# Patient Record
Sex: FEMALE | Race: BLACK OR AFRICAN AMERICAN | NOT HISPANIC OR LATINO | Employment: FULL TIME | URBAN - METROPOLITAN AREA
[De-identification: names, ages, dates, MRNs, and addresses within clinical notes are randomized per-mention and may not be internally consistent; named-entity substitution may affect disease eponyms.]

---

## 2018-10-05 LAB
C TRACH DNA SPEC QL PROBE+SIG AMP: NEGATIVE
N GONORRHOEA DNA SPEC QL PROBE+SIG AMP: NEGATIVE
SPECIMEN DESCRIP: NORMAL
SPECIMEN DESCRIPTION: NORMAL

## 2018-11-13 ENCOUNTER — TELEPHONE (OUTPATIENT)
Dept: MIDWIFE SERVICES | Facility: CLINIC | Age: 23
End: 2018-11-13

## 2018-11-13 NOTE — TELEPHONE ENCOUNTER
Patient LMP was 10/5/17, 5w4d. Pt calling to set up first prenatal appointment. Nurse intake appointment scheduled. Pt asked what she needs to know for pregnancy since this is her first pregnancy. Discussed with patient that all of this information will be given to her at her nurse intake appointment. Advised taking a prenatal vitamin - pt will purchase OTC. Asked pt if she is planning on seeing midwife or provider for pregnancy. Pt unsure - asked what the difference was. Explained, told her to think about it and have an idea when she comes for nurse intake appt as Irasema will set up her first prenatal appt. Pt stated understanding. Amanda Jackman, RN-BSN

## 2018-11-28 ENCOUNTER — PRENATAL OFFICE VISIT (OUTPATIENT)
Dept: NURSING | Facility: CLINIC | Age: 23
End: 2018-11-28
Payer: MEDICAID

## 2018-11-28 VITALS
SYSTOLIC BLOOD PRESSURE: 101 MMHG | TEMPERATURE: 98 F | HEART RATE: 76 BPM | BODY MASS INDEX: 18.15 KG/M2 | HEIGHT: 67 IN | DIASTOLIC BLOOD PRESSURE: 68 MMHG | WEIGHT: 115.6 LBS

## 2018-11-28 DIAGNOSIS — Z23 NEED FOR TDAP VACCINATION: ICD-10-CM

## 2018-11-28 DIAGNOSIS — Z34.00 SUPERVISION OF NORMAL FIRST PREGNANCY: Primary | ICD-10-CM

## 2018-11-28 LAB
ABO + RH BLD: NORMAL
ABO + RH BLD: NORMAL
ALBUMIN UR-MCNC: NEGATIVE MG/DL
APPEARANCE UR: CLEAR
BILIRUB UR QL STRIP: NEGATIVE
BLD GP AB SCN SERPL QL: NORMAL
BLOOD BANK CMNT PATIENT-IMP: NORMAL
COLOR UR AUTO: YELLOW
ERYTHROCYTE [DISTWIDTH] IN BLOOD BY AUTOMATED COUNT: 12 % (ref 10–15)
GLUCOSE UR STRIP-MCNC: NEGATIVE MG/DL
HCT VFR BLD AUTO: 36.4 % (ref 35–47)
HGB BLD-MCNC: 12.5 G/DL (ref 11.7–15.7)
HGB UR QL STRIP: NEGATIVE
KETONES UR STRIP-MCNC: NEGATIVE MG/DL
LEUKOCYTE ESTERASE UR QL STRIP: ABNORMAL
MCH RBC QN AUTO: 30.5 PG (ref 26.5–33)
MCHC RBC AUTO-ENTMCNC: 34.3 G/DL (ref 31.5–36.5)
MCV RBC AUTO: 89 FL (ref 78–100)
NITRATE UR QL: NEGATIVE
PH UR STRIP: 6.5 PH (ref 5–7)
PLATELET # BLD AUTO: 254 10E9/L (ref 150–450)
RBC # BLD AUTO: 4.1 10E12/L (ref 3.8–5.2)
SOURCE: ABNORMAL
SP GR UR STRIP: 1.02 (ref 1–1.03)
SPECIMEN EXP DATE BLD: NORMAL
UROBILINOGEN UR STRIP-ACNC: 0.2 EU/DL (ref 0.2–1)
WBC # BLD AUTO: 7.7 10E9/L (ref 4–11)

## 2018-11-28 PROCEDURE — 86900 BLOOD TYPING SEROLOGIC ABO: CPT | Performed by: ADVANCED PRACTICE MIDWIFE

## 2018-11-28 PROCEDURE — 85027 COMPLETE CBC AUTOMATED: CPT | Performed by: ADVANCED PRACTICE MIDWIFE

## 2018-11-28 PROCEDURE — G0499 HEPB SCREEN HIGH RISK INDIV: HCPCS | Performed by: ADVANCED PRACTICE MIDWIFE

## 2018-11-28 PROCEDURE — 86762 RUBELLA ANTIBODY: CPT | Performed by: ADVANCED PRACTICE MIDWIFE

## 2018-11-28 PROCEDURE — 87389 HIV-1 AG W/HIV-1&-2 AB AG IA: CPT | Performed by: ADVANCED PRACTICE MIDWIFE

## 2018-11-28 PROCEDURE — 86850 RBC ANTIBODY SCREEN: CPT | Performed by: ADVANCED PRACTICE MIDWIFE

## 2018-11-28 PROCEDURE — 87086 URINE CULTURE/COLONY COUNT: CPT | Performed by: ADVANCED PRACTICE MIDWIFE

## 2018-11-28 PROCEDURE — 99000 SPECIMEN HANDLING OFFICE-LAB: CPT | Performed by: ADVANCED PRACTICE MIDWIFE

## 2018-11-28 PROCEDURE — 86780 TREPONEMA PALLIDUM: CPT | Performed by: ADVANCED PRACTICE MIDWIFE

## 2018-11-28 PROCEDURE — 83021 HEMOGLOBIN CHROMOTOGRAPHY: CPT | Mod: 90 | Performed by: ADVANCED PRACTICE MIDWIFE

## 2018-11-28 PROCEDURE — 81003 URINALYSIS AUTO W/O SCOPE: CPT | Performed by: ADVANCED PRACTICE MIDWIFE

## 2018-11-28 PROCEDURE — 99207 ZZC NO CHARGE NURSE ONLY: CPT

## 2018-11-28 PROCEDURE — 86901 BLOOD TYPING SEROLOGIC RH(D): CPT | Performed by: ADVANCED PRACTICE MIDWIFE

## 2018-11-28 PROCEDURE — 36415 COLL VENOUS BLD VENIPUNCTURE: CPT | Performed by: ADVANCED PRACTICE MIDWIFE

## 2018-11-28 NOTE — MR AVS SNAPSHOT
"              After Visit Summary   11/28/2018    Liliam Cole    MRN: [id number]           Patient Information     Date Of Birth          1995        Visit Information        Provider Department      11/28/2018 8:15 AM RD OB NURSE EDUCATION [facility]        Today's Diagnoses     Supervision of normal first pregnancy    -  1    Need for Tdap vaccination           Follow-ups after your visit        Your next 10 appointments already scheduled     Dec 21, 2018  9:00 AM CST   New Prenatal with CURTIS Alvarez CNM   [facility] ([facility])    [address] [place] [address] [phone]              Who to contact     If you have questions or need follow up information about today's clinic visit or your schedule please contact [facility] directly at [phone].  Normal or non-critical lab and imaging results will be communicated to you by MyChart, letter or phone within 4 business days after the clinic has received the results. If you do not hear from us within 7 days, please contact the clinic through MyChart or phone. If you have a critical or abnormal lab result, we will notify you by phone as soon as possible.  Submit refill requests through BTCJam or call your pharmacy and they will forward the refill request to us. Please allow 3 business days for your refill to be completed.          Additional Information About Your Visit        MyChart Information     BTCJam lets you send messages to your doctor, view your test results, renew your prescriptions, schedule appointments and more. To sign up, go to [email or web address] . Click on \"Log in\" on the left side of the screen, which will take you to the Welcome page. Then click on \"Sign up Now\" on the right side of the page.     You will be asked to enter the access code listed below, as well as some personal information. Please follow the directions to " "create your username and password.     Your access code is: [email or web address]  Expires: 2019  8:22 AM     Your access code will  in 90 days. If you need help or a new code, please call your [place] clinic or [phone].        Care EveryWhere ID     This is your Care EveryWhere ID. This could be used by other organizations to access your [place] medical records  [id number]        Your Vitals Were     Pulse Temperature Height Last Period BMI (Body Mass Index)       76 98  F (36.7  C) 5' 7\" (1.702 m) 10/05/2018 18.11 kg/m2        Blood Pressure from Last 3 Encounters:   18 101/68   10/16/12 108/64    Weight from Last 3 Encounters:   18 115 lb 9.6 oz (52.4 kg)   10/16/12 130 lb (59 kg) (60 %)*     * Growth percentiles are based on [facility] 2-20 Years data.              We Performed the Following     ABO/Rh type and screen     CBC with platelets     Hepatitis B surface antigen     HGB Eval Reflex to ELP or RBC Solubility     HIV Antigen Antibody Combo     Rubella Antibody IgG Quantitative     Treponema Abs w Reflex to RPR and Titer     UA without Microscopic     Urine Culture Aerobic Bacterial          Today's Medication Changes          These changes are accurate as of 18  9:19 AM.  If you have any questions, ask your nurse or doctor.               Stop taking these medicines if you haven't already. Please contact your care team if you have questions.     fluconazole 150 MG tablet   Commonly known as:  DIFLUCAN                    Primary Care Provider Office Phone # Fax #    [facility] [phone]       [address] [address]        Equal Access to Services     GERARD JACKSON : Hadii aad ku hadasho Soomaali, waaxda luqadaha, qaybta kaalmada adeegyada, shanel rosenthal. So [facility] [phone].    ATENCIÓN: Si habla español, tiene a terry disposición servicios gratuitos de asistencia lingüística. Llame al [phone].    We " comply with applicable federal civil rights laws and [place] laws. We do not discriminate on the basis of race, color, national origin, age, disability, sex, sexual orientation, or gender identity.            Thank you!     Thank you for choosing [facility]  for your care. Our goal is always to provide you with excellent care. Hearing back from our patients is one way we can continue to improve our services. Please take a few minutes to complete the written survey that you may receive in the mail after your visit with us. Thank you!             Your Updated Medication List - Protect others around you: Learn how to safely use, store and throw away your medicines at www.disposemymeds.org.          This list is accurate as of 11/28/18  9:19 AM.  Always use your most recent med list.                   Brand Name Dispense Instructions for use Diagnosis    PRENATAL + DHA PO
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
## 2018-11-29 LAB
BACTERIA SPEC CULT: NORMAL
HBV SURFACE AG SERPL QL IA: NONREACTIVE
HGB A1 MFR BLD: 97 % (ref 95–97.9)
HGB A2 MFR BLD: 2.6 % (ref 2–3.5)
HGB C MFR BLD: 0 % (ref 0–0)
HGB E MFR BLD: 0 % (ref 0–0)
HGB F MFR BLD: 0.4 % (ref 0–2.1)
HGB FRACT BLD ELPH-IMP: NORMAL
HGB OTHER MFR BLD: 0 % (ref 0–0)
HGB S BLD QL SOLY: NORMAL
HGB S MFR BLD: 0 % (ref 0–0)
HIV 1+2 AB+HIV1 P24 AG SERPL QL IA: NONREACTIVE
PATH INTERP BLD-IMP: NORMAL
RUBV IGG SERPL IA-ACNC: 13 IU/ML
SPECIMEN SOURCE: NORMAL
T PALLIDUM AB SER QL: NONREACTIVE

## 2018-12-04 ENCOUNTER — HEALTH MAINTENANCE LETTER (OUTPATIENT)
Age: 23
End: 2018-12-04

## 2018-12-05 ENCOUNTER — OFFICE VISIT (OUTPATIENT)
Dept: FAMILY MEDICINE | Facility: CLINIC | Age: 23
End: 2018-12-05
Payer: MEDICAID

## 2018-12-05 VITALS
TEMPERATURE: 97.3 F | HEIGHT: 65 IN | BODY MASS INDEX: 19.79 KG/M2 | HEART RATE: 85 BPM | SYSTOLIC BLOOD PRESSURE: 106 MMHG | DIASTOLIC BLOOD PRESSURE: 68 MMHG | WEIGHT: 118.8 LBS | RESPIRATION RATE: 16 BRPM

## 2018-12-05 DIAGNOSIS — F32.A DEPRESSION DURING PREGNANCY IN FIRST TRIMESTER: Primary | ICD-10-CM

## 2018-12-05 DIAGNOSIS — O99.341 DEPRESSION DURING PREGNANCY IN FIRST TRIMESTER: Primary | ICD-10-CM

## 2018-12-05 PROCEDURE — 99213 OFFICE O/P EST LOW 20 MIN: CPT | Performed by: NURSE PRACTITIONER

## 2018-12-05 ASSESSMENT — ANXIETY QUESTIONNAIRES
5. BEING SO RESTLESS THAT IT IS HARD TO SIT STILL: SEVERAL DAYS
3. WORRYING TOO MUCH ABOUT DIFFERENT THINGS: NEARLY EVERY DAY
7. FEELING AFRAID AS IF SOMETHING AWFUL MIGHT HAPPEN: NEARLY EVERY DAY
1. FEELING NERVOUS, ANXIOUS, OR ON EDGE: NEARLY EVERY DAY
2. NOT BEING ABLE TO STOP OR CONTROL WORRYING: MORE THAN HALF THE DAYS
6. BECOMING EASILY ANNOYED OR IRRITABLE: MORE THAN HALF THE DAYS
GAD7 TOTAL SCORE: 15

## 2018-12-05 ASSESSMENT — PATIENT HEALTH QUESTIONNAIRE - PHQ9
5. POOR APPETITE OR OVEREATING: SEVERAL DAYS
SUM OF ALL RESPONSES TO PHQ QUESTIONS 1-9: 13

## 2018-12-05 NOTE — MR AVS SNAPSHOT
After Visit Summary   12/5/2018    Liliam Cole    MRN: [id number]           Patient Information     Date Of Birth          1995        Visit Information        Provider Department      12/5/2018 1:20 PM Mary Lara APRN CNP [facility]        Today's Diagnoses     Depression during pregnancy in first trimester    -  1       Follow-ups after your visit        Additional Services     MENTAL HEALTH REFERRAL  - Adult; Outpatient Treatment; Individual/Couples/Family/Group Therapy/Health Psychology; [facility] [phone]; We will contact you to schedule the appointment or please call with any questions       All scheduling is subject to the client's specific insurance plan & benefits, provider/location availability, and provider clinical specialities.  Please arrive 15 minutes early for your first appointment and bring your completed paperwork.    Please be aware that coverage of these services is subject to the terms and limitations of your health insurance plan.  Call member services at your health plan with any benefit or coverage questions.                            Your next 10 appointments already scheduled     Dec 21, 2018  9:00 AM HARJIT   [facility] with CURTIS Alvarez CNM   [facility] [place]    [address] [place] [address] [phone]              Who to contact     If you have questions or need follow up information about today's clinic visit or your schedule please contact [facility] directly at [phone].  Normal or non-critical lab and imaging results will be communicated to you by MyChart, letter or phone within 4 business days after the clinic has received the results. If you do not hear from us within 7 days, please contact the clinic through MyChart or phone. If you have a critical or abnormal lab result, we will notify you by  "phone as soon as possible.  Submit refill requests through RxAnte or call your pharmacy and they will forward the refill request to us. Please allow 3 business days for your refill to be completed.          Additional Information About Your Visit        [employer] Information     RxAnte gives you secure access to your electronic health record. If you see a primary care provider, you can also send messages to your care team and make appointments. If you have questions, please call your primary care clinic.  If you do not have a primary care provider, please call [phone] and they will assist you.        Care EveryWhere ID     This is your Care EveryWhere ID. This could be used by other organizations to access your [place] medical records  [id number]        Your Vitals Were     Pulse Temperature Respirations Height Last Period BMI (Body Mass Index)    85 97.3  F (36.3  C) (Oral) 16 5' 5.25\" (1.657 m) 10/05/2018 19.62 kg/m2       Blood Pressure from Last 3 Encounters:   12/05/18 106/68   11/28/18 101/68   10/16/12 108/64    Weight from Last 3 Encounters:   12/05/18 118 lb 12.8 oz (53.9 kg)   11/28/18 115 lb 9.6 oz (52.4 kg)   10/16/12 130 lb (59 kg) (60 %)*     * Growth percentiles are based on [facility] 2-20 Years data.              We Performed the Following     MENTAL HEALTH REFERRAL  - Adult; Outpatient Treatment; Individual/Couples/Family/Group Therapy/Health Psychology; [facility]: [facility] [phone]; We will contact you to schedule the appointment or please call with any questions        Primary Care Provider Office Phone # Fax #    [facility] [place] [facility] [phone]       [address] [address]        Equal Access to Services     ARIANNE JACKSON : Darlene Jiménez, bam brunson, qadebbie kaalshanel colon. So [facility] [phone].    ATENCIÓN: Si habla español, tiene a terry disposición servicios " tk de asistencia lingüística. Ilana castaneda [phone].    We comply with applicable federal civil rights laws and [place] laws. We do not discriminate on the basis of race, color, national origin, age, disability, sex, sexual orientation, or gender identity.            Thank you!     Thank you for choosing [facility]  for your care. Our goal is always to provide you with excellent care. Hearing back from our patients is one way we can continue to improve our services. Please take a few minutes to complete the written survey that you may receive in the mail after your visit with us. Thank you!             Your Updated Medication List - Protect others around you: Learn how to safely use, store and throw away your medicines at www.disposemymeds.org.          This list is accurate as of 12/5/18  2:01 PM.  Always use your most recent med list.                   Brand Name Dispense Instructions for use Diagnosis    PRENATAL + DHA PO
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
## 2018-12-07 ASSESSMENT — ANXIETY QUESTIONNAIRES: GAD7 TOTAL SCORE: 15

## 2018-12-18 ASSESSMENT — PATIENT HEALTH QUESTIONNAIRE - PHQ9
SUM OF ALL RESPONSES TO PHQ QUESTIONS 1-9: 14
SUM OF ALL RESPONSES TO PHQ QUESTIONS 1-9: 14
10. IF YOU CHECKED OFF ANY PROBLEMS, HOW DIFFICULT HAVE THESE PROBLEMS MADE IT FOR YOU TO DO YOUR WORK, TAKE CARE OF THINGS AT HOME, OR GET ALONG WITH OTHER PEOPLE: VERY DIFFICULT

## 2018-12-19 ASSESSMENT — PATIENT HEALTH QUESTIONNAIRE - PHQ9: SUM OF ALL RESPONSES TO PHQ QUESTIONS 1-9: 14

## 2018-12-21 ENCOUNTER — PRENATAL OFFICE VISIT (OUTPATIENT)
Dept: MIDWIFE SERVICES | Facility: CLINIC | Age: 23
End: 2018-12-21

## 2018-12-21 ENCOUNTER — DOCUMENTATION ONLY (OUTPATIENT)
Dept: MATERNAL FETAL MEDICINE | Facility: CLINIC | Age: 23
End: 2018-12-21

## 2018-12-21 VITALS
SYSTOLIC BLOOD PRESSURE: 121 MMHG | DIASTOLIC BLOOD PRESSURE: 74 MMHG | TEMPERATURE: 97.1 F | HEART RATE: 83 BPM | WEIGHT: 118 LBS | BODY MASS INDEX: 19.49 KG/M2

## 2018-12-21 DIAGNOSIS — O30.041 DICHORIONIC DIAMNIOTIC TWIN PREGNANCY IN FIRST TRIMESTER: Primary | ICD-10-CM

## 2018-12-21 DIAGNOSIS — Z34.91 PRENATAL CARE IN FIRST TRIMESTER: ICD-10-CM

## 2018-12-21 PROCEDURE — 99207 ZZC FIRST OB VISIT: CPT | Performed by: ADVANCED PRACTICE MIDWIFE

## 2018-12-21 NOTE — NURSING NOTE
"Chief Complaint   Patient presents with     Prenatal Care       Initial /74   Pulse 83   Temp 97.1  F (36.2  C) (Oral)   Wt 53.5 kg (118 lb)   LMP 10/05/2018   BMI 19.49 kg/m   Estimated body mass index is 19.49 kg/m  as calculated from the following:    Height as of 18: 1.657 m (5' 5.25\").    Weight as of this encounter: 53.5 kg (118 lb).  BP completed using cuff size: regular    Questioned patient about current smoking habits.  Pt. has never smoked.          The following HM Due: NONE      The following patient reported/Care Every where data was sent to:  P ABSTRACT QUALITY INITIATIVES [[id number]]  na      n/a              "

## 2018-12-21 NOTE — PROGRESS NOTES
Referral received for twin pregnancy.  Chart reviewed- no formal ultrasound done.  Pt will be scheduled in first trimester.

## 2018-12-21 NOTE — LETTER
[address] [address]  [phone]      December 21, 2018      Liliam Cole  [address]  [place] [address]              To Whom It May Concern:    Liliam Cole is being seen in our clinic for prenatal care.  Her Estimated Date of Delivery: Jul 12, 2019.  Patient's last menstrual period was 10/05/2018..    Liliam Cole is pregnant with twins so she has increased risk for severe nausea and vomiting.      Please allow her to have decreased time on her feet.   Limit wt to less than 25 pounds and not repetitive lifting and bending and stooping.      Allow frequent hydration and bathroom brakes to prevent dehydration and nausea.      Call out office if you need additional information.          Sincerely,        Finesse Galo CNM

## 2018-12-23 PROBLEM — Z34.91 PRENATAL CARE IN FIRST TRIMESTER: Status: ACTIVE | Noted: 2018-12-23

## 2018-12-23 PROBLEM — O30.041 DICHORIONIC DIAMNIOTIC TWIN PREGNANCY IN FIRST TRIMESTER: Status: ACTIVE | Noted: 2018-12-23

## 2018-12-23 NOTE — PROGRESS NOTES
Liliam is here with DENITA Ramirez for NOB exam.  Had intake with labs reviewed today as normal,  No significant medical issues noted.  Unable to hear FHT with doppler so bedside US to confirm viability.  US with di/di twins at 11W 3 days and 11W 4 days CRL.   Surprised and excited and discussed twin gestation and increased risks for PTL and need to have YOVANY to MD service to manage pregnancy.  Discussed  testing and really want this now with twin gestation so will order MFM and will have growth US in future.     Pt thinks she had pap and GC/Chlamydia at  but no pap on record.  Gc/Chlamydia NEG 10/5/18.    ASSESSMENT: 11w,  Twin gestation,    PLAN: RTC in 4 wks for MSAFP? Twins?,  MFM US for 1st tri screening,  YOVANY to MD service for pregnancy mgt.    DINH Cole is a 23 year old co-habitating  who is not a previous CNM patient.  She presents for a new OB Visit.         Patient's last menstrual period was 10/05/2018 (exact date)..  Estimated Date of Delivery: 2019.  Estimated Date of Delivery: 2019  correspond with Patient's last menstrual period was 10/05/2018 (exact date).  She has not had bleeding since her LMP.  This was not a planned pregnancy.   FOB is James who is in good health.  FOB IS actively involved in relationship with Liliam Cole and this pregnancy.        Current medications are:    Current Outpatient Medications:      Prenatal-FeFum-FA-DHA w/o A (PRENATAL + DHA PO), , Disp: , Rfl:      =========================================    INFECTION HISTORY  HIV: no  Hepatitis B: no  Hepatitis C: no  Tuberculosis: no  Last PPD   Herpes self: no  Herpes partner:  no  Chlamydia:  no  Gonorrhea:  no  HPV: no  BV:  no  Trichomoniasis:  no  Syphilis:  no  Chicken Pox:  yes  ============================================    PERSONAL/SOCIAL HISTORY  Lives with partner.  Exercise Habits:  none irregularly days per week.  Employment: Full time.  Her job involves moderate  activity with little potential for toxic exposure.  Travel plans are none planned. Additional items: Has applied for [facility]  =============================================    REVIEW OF SYSTEMS  CONSTITUTIONAL:  She denies fever, chills and viral infections since her last LMP.  There is mild fatigue.  Weigh loss has not occurred..  DIET: Appetite is increase.  Eats a Regular diet.    Recommend to gain >35 pounds with her pregnancy.  SKIN: Denies changes and suspicious moles or lesions.  HEAD: No headache since LMP  denies dizziness and fainting.  ENT: Denies blurred vision, hearing loss, tinnitus, frequent colds, epistaxis, hoarseness and tooth pain.    ENDOCRINE: Denies heat and cold intolerance, and polydipsia.    BREASTS:  Tender since LMP.  Denies discharge and lumps.  She does not do SBE on a monthly basis.  HEART/LUNGS: Denies dyspnea, wheezing, coughing and chest pain.  HEMATOLOGIC: Denies tendency to bruise and history of blood clots.  GASTROINTESTINAL: Denies heartburn, indigestion and change of color in stools.  She has had mild nausea..  Constipation has notbeen a problem since LMP.  She denies hemorrhoids.  Denies rectal bleeding.   GENITOURINARY:  Denies urgency, dysuria and hematuria.  Has frequency of urination since LMP.  She does not experience stress incontinence.  MUSCULOSKELETAL: Denies joint stiffness, pain and restricted motion.  NEUROLOGIC:  Denies seizures, weakness and fainting.  PSYCHIATRIC:  Denies mood changes  Has no previous psychiatric history or mental health treatment.  ===========================================    PHYSICAL EXAM  GENERAL:  23 year old pleasant pregnant female, alert, cooperative and  well groomed.  SKIN:  Warm and dry, without lesions or tenderness.    HEAD: Symmetrical features.   EYES:  PERRLA, wears no corective lenses.  EARS: Tympanic membranes gray, translucent and intact.  NOSE: No flaring, patent  MOUTH:  Buccal mucosa pink, moist without lesions.  Teeth in good  repair.    NECK:  Thyroid without enlargement and nodules.  Lymph nodes not palpable.   LUNGS:  Clear to auscultation.  BREAST:  Symmetrical without lesions or nodes.  Nipples everted.  Areolas symmetric.  No palpable axillary nodes.  HEART:  RRR without murmur.  ABDOMEN: Soft without masses or tenderness.  No CVA tenderness.  Uterus palpable.  No scars noted..  MUSCULOSKELETAL:  Full range of motion.  EXTREMITIES:  2+/2+ DTR, No edema. No significant varicosities.  ===================================================    PLAN:  GC/Chlamydia screening: Done 10/5/18   NEG/Neg  NOB Labs as appropriate for patient.     consult for US for AMA patients.  Pap as indicated.  Instructed on use of triage nurse line and contacting the on call CNM after hours in an emergency.      Genetic Screening testing reviewed:  Multiple Marker Screening (QUAD Test) :  which is performed between 15 and 20 wks and combines the patients age, and measurement of MSAFP, hCG, uE3 and Inhibin A from the maternal serum.  This screen detects 70% of Down Syndrome and 60% of Trisomy 18 infants with a 5% screen positive rate.   Patient will have QUAD screen drawn: No - Twins.    Genetic Ultrasound which will be performed at 18-20 wks gestation will discover aneuploid markers in 60% of fetuses with Down Syndrome with 40% appearing normal by US.    Discussed the risks, benefits, side effects and alternative therapies for current prescribed and OTC medications.    Will return to the clinic in 4 weeks for her next routine prenatal check.  Will call to be seen sooner if problems arise.    Finesse ACEVEDO, CNM

## 2018-12-24 ENCOUNTER — TRANSCRIBE ORDERS (OUTPATIENT)
Dept: MIDWIFE SERVICES | Facility: CLINIC | Age: 23
End: 2018-12-24

## 2018-12-24 DIAGNOSIS — O26.90 PREGNANCY RELATED CONDITION, ANTEPARTUM: Primary | ICD-10-CM

## 2018-12-28 ENCOUNTER — TELEPHONE (OUTPATIENT)
Dept: MATERNAL FETAL MEDICINE | Facility: CLINIC | Age: 23
End: 2018-12-28

## 2018-12-28 ENCOUNTER — AMBULATORY - HEALTHEAST (OUTPATIENT)
Dept: MATERNAL FETAL MEDICINE | Facility: HOSPITAL | Age: 23
End: 2018-12-28

## 2018-12-28 DIAGNOSIS — Z34.90 PRENATAL CARE, ANTEPARTUM: ICD-10-CM

## 2018-12-28 NOTE — TELEPHONE ENCOUNTER
Pt. Going to [facility] location for FTS and L2 Twins.    PARTH Church

## 2018-12-31 ENCOUNTER — AMBULATORY - HEALTHEAST (OUTPATIENT)
Dept: MATERNAL FETAL MEDICINE | Facility: HOSPITAL | Age: 23
End: 2018-12-31

## 2019-01-02 ENCOUNTER — RECORDS - HEALTHEAST (OUTPATIENT)
Dept: ULTRASOUND IMAGING | Facility: HOSPITAL | Age: 24
End: 2019-01-02

## 2019-01-02 ENCOUNTER — COMMUNICATION - HEALTHEAST (OUTPATIENT)
Dept: MATERNAL FETAL MEDICINE | Facility: HOSPITAL | Age: 24
End: 2019-01-02

## 2019-01-02 ENCOUNTER — OFFICE VISIT - HEALTHEAST (OUTPATIENT)
Dept: MATERNAL FETAL MEDICINE | Facility: HOSPITAL | Age: 24
End: 2019-01-02

## 2019-01-02 ENCOUNTER — RECORDS - HEALTHEAST (OUTPATIENT)
Dept: ADMINISTRATIVE | Facility: OTHER | Age: 24
End: 2019-01-02

## 2019-01-02 ENCOUNTER — TRANSCRIBE ORDERS (OUTPATIENT)
Dept: MIDWIFE SERVICES | Facility: CLINIC | Age: 24
End: 2019-01-02

## 2019-01-02 DIAGNOSIS — Z34.90 PRENATAL CARE, ANTEPARTUM: ICD-10-CM

## 2019-01-02 DIAGNOSIS — O26.90 PREGNANCY RELATED CONDITION, ANTEPARTUM: Primary | ICD-10-CM

## 2019-01-02 DIAGNOSIS — Z34.90 ENCOUNTER FOR SUPERVISION OF NORMAL PREGNANCY, UNSPECIFIED, UNSPECIFIED TRIMESTER: ICD-10-CM

## 2019-01-02 DIAGNOSIS — O30.041 DICHORIONIC DIAMNIOTIC TWIN PREGNANCY IN FIRST TRIMESTER: ICD-10-CM

## 2019-01-02 DIAGNOSIS — Z31.5 ENCOUNTER FOR PROCREATIVE GENETIC COUNSELING: ICD-10-CM

## 2019-01-03 ENCOUNTER — TELEPHONE (OUTPATIENT)
Dept: OBGYN | Facility: CLINIC | Age: 24
End: 2019-01-03

## 2019-01-03 NOTE — TELEPHONE ENCOUNTER
"Reason for call:  Other   Patient called regarding (reason for call): call back  Additional comments: ***    Phone number to reach patient:  {[phone]    Best Time:  ***    Can we leave a detailed message on this number?  { :[phone]::\"YES\"}    "

## 2019-01-18 ENCOUNTER — PRENATAL OFFICE VISIT (OUTPATIENT)
Dept: OBGYN | Facility: CLINIC | Age: 24
End: 2019-01-18

## 2019-01-18 VITALS
HEART RATE: 83 BPM | SYSTOLIC BLOOD PRESSURE: 114 MMHG | TEMPERATURE: 97 F | BODY MASS INDEX: 20.48 KG/M2 | DIASTOLIC BLOOD PRESSURE: 76 MMHG | WEIGHT: 124 LBS

## 2019-01-18 DIAGNOSIS — O30.041 DICHORIONIC DIAMNIOTIC TWIN PREGNANCY IN FIRST TRIMESTER: Primary | ICD-10-CM

## 2019-01-18 PROCEDURE — 99207 ZZC PRENATAL VISIT: CPT | Performed by: OBSTETRICS & GYNECOLOGY

## 2019-01-18 NOTE — PROGRESS NOTES
Transfer from [facility]  15w0d  Uncomplicated di/di twin gestation.    Finally feeling good this pregnancy.  Nausea improved; minimal at this point.  No emesis or dry heaving anymore.  US scheduled for 2/11.    1. Reviewed routine prenatal care. Discussed MD call schedule as well as role of residents and med students both in clinic and hospital.  She is okay with resident care  2. Routine Prenatal Care: the patient will return to clinic in 4 weeks and prn    Mary Glasgow MD

## 2019-01-23 ENCOUNTER — MYC MEDICAL ADVICE (OUTPATIENT)
Dept: FAMILY MEDICINE | Facility: CLINIC | Age: 24
End: 2019-01-23
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
## 2019-01-23 NOTE — TELEPHONE ENCOUNTER
Pt called back  Advised of msg from Mary Lara CNP  Appointment made for Friday am with Rosalba Paige CNP  Thanks!     Miriam Live RN

## 2019-01-23 NOTE — TELEPHONE ENCOUNTER
#1, yes she needs to be seen.      #2, she specifically asked specifically for an MD opinion.      I would recommend she get scheduled with an MD.
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
## 2019-01-25 ENCOUNTER — OFFICE VISIT (OUTPATIENT)
Dept: FAMILY MEDICINE | Facility: CLINIC | Age: 24
End: 2019-01-25
Payer: MEDICAID

## 2019-01-25 VITALS
HEIGHT: 65 IN | RESPIRATION RATE: 16 BRPM | BODY MASS INDEX: 20.37 KG/M2 | HEART RATE: 95 BPM | TEMPERATURE: 97.4 F | OXYGEN SATURATION: 99 % | WEIGHT: 122.25 LBS

## 2019-01-25 DIAGNOSIS — O30.041 DICHORIONIC DIAMNIOTIC TWIN PREGNANCY IN FIRST TRIMESTER: ICD-10-CM

## 2019-01-25 DIAGNOSIS — O99.342 DEPRESSION DURING PREGNANCY IN SECOND TRIMESTER: Primary | ICD-10-CM

## 2019-01-25 DIAGNOSIS — F32.A DEPRESSION DURING PREGNANCY IN SECOND TRIMESTER: Primary | ICD-10-CM

## 2019-01-25 PROCEDURE — 99214 OFFICE O/P EST MOD 30 MIN: CPT | Performed by: NURSE PRACTITIONER

## 2019-01-25 ASSESSMENT — ANXIETY QUESTIONNAIRES
5. BEING SO RESTLESS THAT IT IS HARD TO SIT STILL: NEARLY EVERY DAY
2. NOT BEING ABLE TO STOP OR CONTROL WORRYING: NEARLY EVERY DAY
1. FEELING NERVOUS, ANXIOUS, OR ON EDGE: NEARLY EVERY DAY
7. FEELING AFRAID AS IF SOMETHING AWFUL MIGHT HAPPEN: NEARLY EVERY DAY
6. BECOMING EASILY ANNOYED OR IRRITABLE: NEARLY EVERY DAY
3. WORRYING TOO MUCH ABOUT DIFFERENT THINGS: NEARLY EVERY DAY
GAD7 TOTAL SCORE: 20

## 2019-01-25 ASSESSMENT — PATIENT HEALTH QUESTIONNAIRE - PHQ9
5. POOR APPETITE OR OVEREATING: MORE THAN HALF THE DAYS
SUM OF ALL RESPONSES TO PHQ QUESTIONS 1-9: 20

## 2019-01-25 ASSESSMENT — MIFFLIN-ST. JEOR: SCORE: 1309.36
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
## 2019-01-25 NOTE — PATIENT INSTRUCTIONS
For your mental health/well being:   I recommend that you take care of yourself with a healthy diet, fluids, sleep, etc.  Continue care with OB/GYN.  Follow up with a psychotherapist at your earliest convenience for counseling. Call to schedule your first appointment.

## 2019-01-26 ASSESSMENT — ANXIETY QUESTIONNAIRES: GAD7 TOTAL SCORE: 20

## 2019-02-07 ENCOUNTER — OFFICE VISIT (OUTPATIENT)
Dept: FAMILY MEDICINE | Facility: CLINIC | Age: 24
End: 2019-02-07
Payer: MEDICAID

## 2019-02-07 ENCOUNTER — TELEPHONE (OUTPATIENT)
Dept: FAMILY MEDICINE | Facility: CLINIC | Age: 24
End: 2019-02-07

## 2019-02-07 VITALS
HEIGHT: 65 IN | RESPIRATION RATE: 20 BRPM | HEART RATE: 88 BPM | SYSTOLIC BLOOD PRESSURE: 105 MMHG | BODY MASS INDEX: 20.66 KG/M2 | WEIGHT: 124 LBS | DIASTOLIC BLOOD PRESSURE: 62 MMHG | OXYGEN SATURATION: 99 % | TEMPERATURE: 97.8 F

## 2019-02-07 DIAGNOSIS — N76.0 BACTERIAL VAGINOSIS: Primary | ICD-10-CM

## 2019-02-07 DIAGNOSIS — B96.89 BACTERIAL VAGINOSIS: Primary | ICD-10-CM

## 2019-02-07 LAB
SPECIMEN SOURCE: ABNORMAL
WET PREP SPEC: ABNORMAL

## 2019-02-07 PROCEDURE — 99213 OFFICE O/P EST LOW 20 MIN: CPT | Performed by: NURSE PRACTITIONER

## 2019-02-07 PROCEDURE — 87210 SMEAR WET MOUNT SALINE/INK: CPT | Performed by: NURSE PRACTITIONER

## 2019-02-07 RX ORDER — METRONIDAZOLE 500 MG/1
500 TABLET ORAL 2 TIMES DAILY
Qty: 14 TABLET | Refills: 0 | Status: SHIPPED | OUTPATIENT
Start: 2019-02-07 | End: 2019-07-22

## 2019-02-07 ASSESSMENT — MIFFLIN-ST. JEOR: SCORE: 1313.34

## 2019-02-07 NOTE — TELEPHONE ENCOUNTER
"Phone call to patient at the request of CURTIS Escobar CNP    Patient is currently pregnant and scheduled an appointment with CURTIS Escobar CNP  This afternoon for STD and BV testing     CURTIS Escobar CNP would like patient to follow up with OBGYN for this concern     Patient is having vaginal discharge and odor - explained that provider would like her to follow-up with OBGYN and patient states \"she is just trying to leave the clinic early - as I have already gotten a call asking me to come earlier and I am at work and unable to do this\"   RN advised provider is NOT leaving the clinic early and would like OBGYN to assist as they specialize with pregnancy     Patient states \"I dont care if I am pregnant or not I should still be able to get this testing\"     RN asked patient if she was able to get pt an appt. With OBGYN if she would be willing to see them? And patient states she wants this done today and she does not want to drive into [facility] to be seen by her OBGYN     CURTIS Escobar CNP updated     Andreea Contreras Nurse   [facility]     "

## 2019-02-07 NOTE — PROGRESS NOTES
"  SUBJECTIVE:   Liliam Cole is a 23 year old female who presents to clinic today for the following health issues:  Chief Complaint   Patient presents with     Vaginal Problem         Vaginal Symptoms    Duration: one week     Description  Symptoms started about a week ago with a \"weird vaginal discharge - yellow green.\"  No specific odor.  No pelvic pain, contractions.  No external genitalia irritation or itching.  No pelvic pain or fever.  No contractions. She has noticed her first fetal movement!    Urinating normally.  No fever.      Intensity:  moderate    Accompanying signs and symptoms (fever/dysuria/abdominal or back pain): None    History  Sexually active: yes  Possibility of pregnancy: pregnant   Recent antibiotic use: no     Precipitating or alleviating factors: None    Therapies tried and outcome: none         Pregnant:  18 weeks along.  Feeling fetal movements.  Upcoming ultrasound and appointment with OB/GYN.      Problem list and histories reviewed & adjusted, as indicated.  Additional history: as documented    Patient Active Problem List   Diagnosis     Need for Tdap vaccination     Dichorionic diamniotic twin pregnancy in first trimester     Prenatal care in first trimester     Past Surgical History:   Procedure Laterality Date     NO HISTORY OF SURGERY         Social History     Tobacco Use     Smoking status: Never Smoker     Smokeless tobacco: Never Used   Substance Use Topics     Alcohol use: No     Family History   Problem Relation Age of Onset     Depression Mother      Anxiety Disorder Mother      Diabetes Father      Other Cancer Sister          Current Outpatient Medications   Medication Sig Dispense Refill     Prenatal-FeFum-FA-DHA w/o A (PRENATAL + DHA PO)        No Known Allergies  No lab results found.   BP Readings from Last 3 Encounters:   02/07/19 105/62   01/18/19 114/76   12/21/18 121/74    Wt Readings from Last 3 Encounters:   02/07/19 56.2 kg (124 lb)   01/25/19 55.5 kg (122 lb " "4 oz)   01/18/19 56.2 kg (124 lb)              Labs reviewed in EPIC    Reviewed and updated as needed this visit by clinical staff       Reviewed and updated as needed this visit by Provider         ROS:  Constitutional, HEENT, cardiovascular, pulmonary, GI, , musculoskeletal, neuro, skin, endocrine and psych systems are negative, except as otherwise noted.    OBJECTIVE:     /62   Pulse 88   Temp 97.8  F (36.6  C) (Oral)   Resp 20   Ht 1.651 m (5' 5\")   Wt 56.2 kg (124 lb)   LMP 10/05/2018 (Exact Date)   SpO2 99%   Breastfeeding? No   BMI 20.63 kg/m    Body mass index is 20.63 kg/m .  GENERAL APPEARANCE: healthy, alert and no distress. Smiling.   SKIN: warm and dry  PSYCH: mentation appears normal and affect normal/bright.  Good eye contact.    Results for orders placed or performed in visit on 02/07/19   Wet prep   Result Value Ref Range    Specimen Description Vagina     Wet Prep Clue cells seen (A)     Wet Prep No yeast seen     Wet Prep No Trichomonas seen          ASSESSMENT/PLAN:     (N76.0,  B96.89) Bacterial vaginosis  (primary encounter diagnosis)  Comment: acute  Plan: Wet prep, metroNIDAZOLE (FLAGYL) 500 MG tablet       Take the flagyl/metronidazole as prescribed and abstain from all alcohol.    Anticipate steady resolution of symptoms.   Return to the clinic for a recheck if the sx do not resolve with the therapy or worsen in any way.  Follow up with OB/GYn as planned, sooner if concerns.        CURTIS Escobar [facility]    "

## 2019-02-07 NOTE — Clinical Note
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 She was treated and is to follow up with you. Let me know if you have any questions.Rosalba ACEVEDO CNP

## 2019-02-08 NOTE — PATIENT INSTRUCTIONS

## 2019-02-11 ENCOUNTER — OFFICE VISIT - HEALTHEAST (OUTPATIENT)
Dept: MATERNAL FETAL MEDICINE | Facility: HOSPITAL | Age: 24
End: 2019-02-11

## 2019-02-11 ENCOUNTER — RECORDS - HEALTHEAST (OUTPATIENT)
Dept: ADMINISTRATIVE | Facility: OTHER | Age: 24
End: 2019-02-11

## 2019-02-11 ENCOUNTER — RECORDS - HEALTHEAST (OUTPATIENT)
Dept: ULTRASOUND IMAGING | Facility: HOSPITAL | Age: 24
End: 2019-02-11

## 2019-02-11 DIAGNOSIS — Z34.90 ENCOUNTER FOR SUPERVISION OF NORMAL PREGNANCY, UNSPECIFIED, UNSPECIFIED TRIMESTER: ICD-10-CM

## 2019-02-11 DIAGNOSIS — O30.049 TWIN GESTATION, DICHORIONIC DIAMNIOTIC: ICD-10-CM

## 2019-02-22 ENCOUNTER — OFFICE VISIT (OUTPATIENT)
Dept: FAMILY MEDICINE | Facility: CLINIC | Age: 24
End: 2019-02-22
Payer: MEDICAID

## 2019-02-22 VITALS
BODY MASS INDEX: 21.86 KG/M2 | HEIGHT: 65 IN | DIASTOLIC BLOOD PRESSURE: 60 MMHG | HEART RATE: 112 BPM | TEMPERATURE: 97.6 F | SYSTOLIC BLOOD PRESSURE: 110 MMHG | WEIGHT: 131.2 LBS | OXYGEN SATURATION: 98 %

## 2019-02-22 DIAGNOSIS — N89.8 VAGINAL DISCHARGE: Primary | ICD-10-CM

## 2019-02-22 LAB
SPECIMEN SOURCE: NORMAL
WET PREP SPEC: NORMAL

## 2019-02-22 PROCEDURE — 99213 OFFICE O/P EST LOW 20 MIN: CPT | Performed by: NURSE PRACTITIONER

## 2019-02-22 PROCEDURE — 87210 SMEAR WET MOUNT SALINE/INK: CPT | Performed by: NURSE PRACTITIONER

## 2019-02-22 ASSESSMENT — MIFFLIN-ST. JEOR: SCORE: 1346

## 2019-02-22 NOTE — PROGRESS NOTES
SUBJECTIVE:   Liliam Cole is a 23 year old female who presents to clinic today for the following health issues:    Vaginal Symptoms  Onset: Couple weeks ago    Description:  Vaginal Discharge: Yellowish    Itching (Pruritis): no   Burning sensation:  no   Odor: Pt unsure     She was diagnosed with BV 2/7/2019.  She was given a prescription of flagyl BID for 7 days.  She was inconsistent with taking 2 tablets per day but she did finish the full 14 tablet therapy.    Today she is feeling great.  No pelvic pain.  No fever.  Some vaginal discharge, but different than 3 weeks ago.  No urinary symptoms.  No contractions.      Accompanying Signs & Symptoms:  Pain with Urination: no   Abdominal Pain: no   Fever: no     History:   Sexually active: YES  New Partner: no   Possibility of Pregnancy:  Currently pregnancy     Precipitating factors:   Recent Antibiotic Use: YES- for the Yeast infection     Alleviating factors:  None     Therapies Tried and outcome: Antibiotics     20 weeks pregnant.      Problem list and histories reviewed & adjusted, as indicated.  Additional history: as documented    Patient Active Problem List   Diagnosis     Need for Tdap vaccination     Dichorionic diamniotic twin pregnancy in first trimester     Prenatal care in first trimester     Past Surgical History:   Procedure Laterality Date     NO HISTORY OF SURGERY         Social History     Tobacco Use     Smoking status: Never Smoker     Smokeless tobacco: Never Used   Substance Use Topics     Alcohol use: No     Family History   Problem Relation Age of Onset     Depression Mother      Anxiety Disorder Mother      Diabetes Father      Other Cancer Sister          Current Outpatient Medications   Medication Sig Dispense Refill     Prenatal-FeFum-FA-DHA w/o A (PRENATAL + DHA PO)        No Known Allergies  No lab results found.   BP Readings from Last 3 Encounters:   02/22/19 110/60   02/07/19 105/62   01/18/19 114/76    Wt Readings from Last 3  "Encounters:   02/22/19 59.5 kg (131 lb 3.2 oz)   02/07/19 56.2 kg (124 lb)   01/25/19 55.5 kg (122 lb 4 oz)           Labs reviewed in EPIC    Reviewed and updated as needed this visit by clinical staff       Reviewed and updated as needed this visit by Provider         ROS:  Constitutional, HEENT, cardiovascular, pulmonary, GI, , musculoskeletal, neuro, skin, endocrine and psych systems are negative, except as otherwise noted.    OBJECTIVE:     /60   Pulse 112   Temp 97.6  F (36.4  C) (Oral)   Ht 1.651 m (5' 5\")   Wt 59.5 kg (131 lb 3.2 oz)   LMP 10/05/2018 (Exact Date)   SpO2 98%   BMI 21.83 kg/m    Body mass index is 21.83 kg/m .  GENERAL APPEARANCE: healthy, alert and no distress. Smiling.   SKIN: warm and dry  PSYCH: mentation appears normal and affect normal/bright.  Good eye contact.    Diagnostics:  Results for orders placed or performed in visit on 02/22/19   Wet prep   Result Value Ref Range    Specimen Description Vagina     Wet Prep No Trichomonas seen     Wet Prep No clue cells seen     Wet Prep No yeast seen          ASSESSMENT/PLAN:     (N89.8) Vaginal discharge  (primary encounter diagnosis)  Comment: physiologic  Plan: Wet prep        I reassured the patient today that her wet prep is clear.  She was relieved with the information.  She will monitor her symptoms.  If any changes/worening, she will be rechecked.    CURTIS Escobar [facility]  "

## 2019-03-07 ENCOUNTER — PRENATAL OFFICE VISIT (OUTPATIENT)
Dept: OBGYN | Facility: CLINIC | Age: 24
End: 2019-03-07
Payer: COMMERCIAL

## 2019-03-07 VITALS
WEIGHT: 140 LBS | DIASTOLIC BLOOD PRESSURE: 64 MMHG | TEMPERATURE: 97 F | SYSTOLIC BLOOD PRESSURE: 111 MMHG | HEART RATE: 86 BPM | BODY MASS INDEX: 23.3 KG/M2

## 2019-03-07 DIAGNOSIS — O30.042 DICHORIONIC DIAMNIOTIC TWIN PREGNANCY IN SECOND TRIMESTER: Primary | ICD-10-CM

## 2019-03-07 DIAGNOSIS — Z87.440 HISTORY OF PYELONEPHRITIS DURING PREGNANCY: ICD-10-CM

## 2019-03-07 DIAGNOSIS — Z87.59 HISTORY OF PYELONEPHRITIS DURING PREGNANCY: ICD-10-CM

## 2019-03-07 PROCEDURE — 99207 ZZC PRENATAL VISIT: CPT | Performed by: OBSTETRICS & GYNECOLOGY

## 2019-03-07 NOTE — PROGRESS NOTES
21w6d  In the hospital last week 2/26-3/1 with pyelonephritis. She presented with back pain, fever and tachycradia. She was treated with IV hydration and IV antibiotics. Her urine culture demonstrated 10 to 50K Group B strep and <10K urogenital dianne.   No fevers or pain since leaving the hospital. Active fetal movement.   Taking keflex 4 times daily for 10 days.   Recommend return to clinic next week for repeat urine culture.  Has US next week - in [place].   Mary Amaya MD

## 2019-03-11 ENCOUNTER — RECORDS - HEALTHEAST (OUTPATIENT)
Dept: ADMINISTRATIVE | Facility: OTHER | Age: 24
End: 2019-03-11

## 2019-03-11 ENCOUNTER — RECORDS - HEALTHEAST (OUTPATIENT)
Dept: ULTRASOUND IMAGING | Facility: HOSPITAL | Age: 24
End: 2019-03-11

## 2019-03-11 ENCOUNTER — OFFICE VISIT - HEALTHEAST (OUTPATIENT)
Dept: MATERNAL FETAL MEDICINE | Facility: HOSPITAL | Age: 24
End: 2019-03-11

## 2019-03-11 DIAGNOSIS — O30.049 TWIN PREGNANCY, DICHORIONIC/DIAMNIOTIC, UNSPECIFIED TRIMESTER: ICD-10-CM

## 2019-03-11 DIAGNOSIS — O30.042 DICHORIONIC DIAMNIOTIC TWIN PREGNANCY IN SECOND TRIMESTER: ICD-10-CM

## 2019-03-15 ENCOUNTER — PRENATAL OFFICE VISIT (OUTPATIENT)
Dept: OBGYN | Facility: CLINIC | Age: 24
End: 2019-03-15
Payer: COMMERCIAL

## 2019-03-15 VITALS
BODY MASS INDEX: 22.96 KG/M2 | SYSTOLIC BLOOD PRESSURE: 112 MMHG | TEMPERATURE: 97.4 F | WEIGHT: 138 LBS | HEART RATE: 91 BPM | DIASTOLIC BLOOD PRESSURE: 69 MMHG

## 2019-03-15 DIAGNOSIS — Z87.440 HISTORY OF PYELONEPHRITIS DURING PREGNANCY: ICD-10-CM

## 2019-03-15 DIAGNOSIS — O30.042 DICHORIONIC DIAMNIOTIC TWIN PREGNANCY IN SECOND TRIMESTER: Primary | ICD-10-CM

## 2019-03-15 DIAGNOSIS — Z87.59 HISTORY OF PYELONEPHRITIS DURING PREGNANCY: ICD-10-CM

## 2019-03-15 PROCEDURE — 87086 URINE CULTURE/COLONY COUNT: CPT | Performed by: OBSTETRICS & GYNECOLOGY

## 2019-03-15 PROCEDURE — 99207 ZZC PRENATAL VISIT: CPT | Performed by: OBSTETRICS & GYNECOLOGY

## 2019-03-15 RX ORDER — CEPHALEXIN 500 MG/1
500 CAPSULE ORAL DAILY
Qty: 60 CAPSULE | Refills: 1 | Status: SHIPPED | OUTPATIENT
Start: 2019-03-15 | End: 2019-04-08

## 2019-03-15 NOTE — PROGRESS NOTES
23w0d  Feeling better.   Repeat urine culture today.  Taking cephalexin 500mg once daily for suppression/prophylaxis.  Active fetal movement x2. No bleeding or pain.   RTC 2-3 weeks, gtt then.  Mary Amaya MD

## 2019-03-16 LAB
BACTERIA SPEC CULT: NO GROWTH
SPECIMEN SOURCE: NORMAL

## 2019-03-28 ENCOUNTER — HOSPITAL ENCOUNTER (OUTPATIENT)
Dept: MEDSURG UNIT | Facility: CLINIC | Age: 24
Discharge: HOME | End: 2019-03-28
Attending: FAMILY MEDICINE | Admitting: FAMILY MEDICINE

## 2019-03-28 LAB
ALBUMIN UR-MCNC: NEGATIVE MG/DL
APPEARANCE UR: ABNORMAL
BACTERIA #/AREA URNS HPF: ABNORMAL HPF
BILIRUB UR QL STRIP: NEGATIVE
COLOR UR AUTO: YELLOW
GLUCOSE UR STRIP-MCNC: NEGATIVE MG/DL
HGB UR QL STRIP: NEGATIVE
KETONES UR STRIP-MCNC: NEGATIVE MG/DL
LEUKOCYTE ESTERASE UR QL STRIP: ABNORMAL
NITRATE UR QL: NEGATIVE
PH UR STRIP: 8 [PH] (ref 4.5–8)
RBC #/AREA URNS AUTO: ABNORMAL HPF
SP GR UR STRIP: 1 (ref 1–1.03)
SQUAMOUS #/AREA URNS AUTO: ABNORMAL LPF
UROBILINOGEN UR STRIP-ACNC: ABNORMAL
WBC #/AREA URNS AUTO: ABNORMAL HPF
WBC CLUMPS #/AREA URNS HPF: PRESENT /[HPF]

## 2019-03-29 ENCOUNTER — PRENATAL OFFICE VISIT (OUTPATIENT)
Dept: OBGYN | Facility: CLINIC | Age: 24
End: 2019-03-29
Payer: COMMERCIAL

## 2019-03-29 VITALS
WEIGHT: 145 LBS | DIASTOLIC BLOOD PRESSURE: 75 MMHG | HEART RATE: 107 BPM | BODY MASS INDEX: 24.13 KG/M2 | SYSTOLIC BLOOD PRESSURE: 113 MMHG

## 2019-03-29 DIAGNOSIS — O30.042 DICHORIONIC DIAMNIOTIC TWIN PREGNANCY IN SECOND TRIMESTER: Primary | ICD-10-CM

## 2019-03-29 LAB
BACTERIA SPEC CULT: NO GROWTH
GLUCOSE 1H P 50 G GLC PO SERPL-MCNC: 72 MG/DL (ref 60–129)
HGB BLD-MCNC: 10.3 G/DL (ref 11.7–15.7)

## 2019-03-29 PROCEDURE — 82950 GLUCOSE TEST: CPT | Performed by: OBSTETRICS & GYNECOLOGY

## 2019-03-29 PROCEDURE — 36415 COLL VENOUS BLD VENIPUNCTURE: CPT | Performed by: OBSTETRICS & GYNECOLOGY

## 2019-03-29 PROCEDURE — 00000218 ZZHCL STATISTIC OBHBG - HEMOGLOBIN: Performed by: OBSTETRICS & GYNECOLOGY

## 2019-03-29 PROCEDURE — 99207 ZZC PRENATAL VISIT: CPT | Performed by: OBSTETRICS & GYNECOLOGY

## 2019-03-29 NOTE — PROGRESS NOTES
25w0d  Seen at [facility] yesterday for right sided pain, resolved. No pain today.   UA there showed large LE, few bact, WBC , squam epis , WBC clumps. Culture in process. No dysuria. Will check culture result next week.     1h gtt 72, Hgb 10.3.   Active fetal movements. No leaking or bleeding.     Childbirth classes? No  Plan on breastfeeding? Yes   Birthcontrol? Undecided. Was on the patch in the past. Discussed progesterone only options  Sex on ultrasound? twin girls  Circumsion? NA  Peds doc? Undecided     RTC 4 weeks.  Mary Amaya MD

## 2019-04-10 ENCOUNTER — OFFICE VISIT - HEALTHEAST (OUTPATIENT)
Dept: MATERNAL FETAL MEDICINE | Facility: HOSPITAL | Age: 24
End: 2019-04-10

## 2019-04-10 ENCOUNTER — RECORDS - HEALTHEAST (OUTPATIENT)
Dept: ULTRASOUND IMAGING | Facility: HOSPITAL | Age: 24
End: 2019-04-10

## 2019-04-10 ENCOUNTER — RECORDS - HEALTHEAST (OUTPATIENT)
Dept: ADMINISTRATIVE | Facility: OTHER | Age: 24
End: 2019-04-10

## 2019-04-10 DIAGNOSIS — O30.042 DICHORIONIC DIAMNIOTIC TWIN PREGNANCY IN SECOND TRIMESTER: ICD-10-CM

## 2019-04-10 DIAGNOSIS — O30.042 TWIN PREGNANCY, DICHORIONIC/DIAMNIOTIC, SECOND TRIMESTER: ICD-10-CM

## 2019-04-22 ENCOUNTER — PRENATAL OFFICE VISIT (OUTPATIENT)
Dept: OBGYN | Facility: CLINIC | Age: 24
End: 2019-04-22
Payer: COMMERCIAL

## 2019-04-22 VITALS
WEIGHT: 148 LBS | BODY MASS INDEX: 24.63 KG/M2 | TEMPERATURE: 98.2 F | DIASTOLIC BLOOD PRESSURE: 72 MMHG | SYSTOLIC BLOOD PRESSURE: 102 MMHG | HEART RATE: 119 BPM

## 2019-04-22 DIAGNOSIS — K21.9 GASTROESOPHAGEAL REFLUX DISEASE WITHOUT ESOPHAGITIS: ICD-10-CM

## 2019-04-22 DIAGNOSIS — O30.043 DICHORIONIC DIAMNIOTIC TWIN PREGNANCY IN THIRD TRIMESTER: Primary | ICD-10-CM

## 2019-04-22 PROCEDURE — 99207 ZZC PRENATAL VISIT: CPT | Performed by: OBSTETRICS & GYNECOLOGY

## 2019-04-22 RX ORDER — OMEPRAZOLE 40 MG/1
40 CAPSULE, DELAYED RELEASE ORAL DAILY
Qty: 60 CAPSULE | Refills: 1 | Status: SHIPPED | OUTPATIENT
Start: 2019-04-22 | End: 2019-07-22

## 2019-04-22 NOTE — PROGRESS NOTES
28w3d  Active fetal movement. No contractions, no leaking or bleeding.   Heartburn is getting worse. Taking zantac 150mg BID with no relief. rx for omeprazole written.  Declined TdaP, discussed recommendation, will talk with  about.  Considering transferring care to [facility], recommend transferring sooner than later now that she is in 3rd trimester.   RTC 2 weeks.  Mary Amaya MD

## 2019-05-02 ENCOUNTER — MYC MEDICAL ADVICE (OUTPATIENT)
Dept: FAMILY MEDICINE | Facility: CLINIC | Age: 24
End: 2019-05-02

## 2019-05-02 NOTE — TELEPHONE ENCOUNTER
Pt emailed back the email for medical records ([email or web address]).   Closing encounter.       Ayaka DEGROOT     [facility]

## 2019-05-10 ENCOUNTER — RECORDS - HEALTHEAST (OUTPATIENT)
Dept: ADMINISTRATIVE | Facility: OTHER | Age: 24
End: 2019-05-10

## 2019-05-10 ENCOUNTER — RECORDS - HEALTHEAST (OUTPATIENT)
Dept: ULTRASOUND IMAGING | Facility: HOSPITAL | Age: 24
End: 2019-05-10

## 2019-05-10 ENCOUNTER — OFFICE VISIT - HEALTHEAST (OUTPATIENT)
Dept: MATERNAL FETAL MEDICINE | Facility: HOSPITAL | Age: 24
End: 2019-05-10

## 2019-05-10 DIAGNOSIS — O30.042 TWIN PREGNANCY, DICHORIONIC/DIAMNIOTIC, SECOND TRIMESTER: ICD-10-CM

## 2019-05-10 DIAGNOSIS — O30.043 DICHORIONIC DIAMNIOTIC TWIN PREGNANCY IN THIRD TRIMESTER: ICD-10-CM

## 2019-05-11 ENCOUNTER — OFFICE VISIT (OUTPATIENT)
Dept: URGENT CARE | Facility: URGENT CARE | Age: 24
End: 2019-05-11
Payer: COMMERCIAL

## 2019-05-11 VITALS
DIASTOLIC BLOOD PRESSURE: 75 MMHG | HEART RATE: 75 BPM | SYSTOLIC BLOOD PRESSURE: 110 MMHG | TEMPERATURE: 98.1 F | OXYGEN SATURATION: 98 %

## 2019-05-11 DIAGNOSIS — M53.3 COCCYX PAIN: Primary | ICD-10-CM

## 2019-05-11 PROCEDURE — 99213 OFFICE O/P EST LOW 20 MIN: CPT

## 2019-05-11 RX ORDER — AZITHROMYCIN 250 MG/1
TABLET, FILM COATED ORAL
COMMUNITY
Start: 2019-05-10 | End: 2019-07-22

## 2019-05-11 NOTE — PROGRESS NOTES
SUBJECTIVE:   Liliam Cole is a 23 year old female who presents to clinic today for the following health issues:      HPI     Presents with increased tailbone pain recently making sitting uncomfortable.  Is 31 weeks GA with twin girls.  Just had U/S and found they are both transverse.  Suddenly more tailbone pain in past day.  Making it now more uncomfortable to sit.  No contractions, no vaginal bleeding.  Otherwise feels well.  No trauma or injury.  Good fetal movement.    Additional history: as documented    Reviewed and updated as needed this visit by clinical staff         Reviewed and updated as needed this visit by Provider             Patient Active Problem List   Diagnosis     Need for Tdap vaccination     Dichorionic diamniotic twin pregnancy in first trimester     Prenatal care in first trimester     Past Surgical History:   Procedure Laterality Date     NO HISTORY OF SURGERY         Social History     Tobacco Use     Smoking status: Never Smoker     Smokeless tobacco: Never Used   Substance Use Topics     Alcohol use: No     Family History   Problem Relation Age of Onset     Depression Mother      Anxiety Disorder Mother      Diabetes Father      Other Cancer Sister          Current Outpatient Medications   Medication Sig Dispense Refill     cephALEXin (KEFLEX) 500 MG capsule Take 1 capsule (500 mg) by mouth daily 60 capsule 1     omeprazole (PRILOSEC) 40 MG DR capsule Take 1 capsule (40 mg) by mouth daily 60 capsule 1     Prenatal-FeFum-FA-DHA w/o A (PRENATAL + DHA PO)        No Known Allergies  No lab results found.   BP Readings from Last 3 Encounters:   04/22/19 102/72   03/29/19 113/75   03/15/19 112/69    Wt Readings from Last 3 Encounters:   04/22/19 67.1 kg (148 lb)   03/29/19 65.8 kg (145 lb)   03/15/19 62.6 kg (138 lb)                    ROS:  Constitutional, HEENT, cardiovascular, pulmonary, gi and gu systems are negative, except as otherwise noted.    OBJECTIVE:     LMP 10/05/2018 (Exact  Date)   There is no height or weight on file to calculate BMI.  GENERAL: healthy, alert, standing up due to discomfort sitting  EYES: Eyes grossly normal to inspection, PERRL and conjunctivae and sclerae normal  HENT: ear canals and TM's normal, nose and mouth without ulcers or lesions  NECK: no adenopathy, no asymmetry, masses, or scars and thyroid normal to palpation  ABDOMEN: gravid  MS: no gross musculoskeletal defects noted, no edema  SKIN: no suspicious lesions or rashes  NEURO: Normal strength and tone, mentation intact and speech normal  PSYCH: mentation appears normal, affect normal/bright    ASSESSMENT/PLAN:     1. Coccyx pain    Recommend donut pillow to help offset tailbone pain and pressure.  Advised to get off feet and lay on left side as much as possible these last weeks before delivery for comfort.  Heat to area prn.  May use Tylenol for pain relief prn.  Reassured this is a normal part of pregnancy in the third trimester as body prepares with softening of ligaments in pelvis for delivery.    Daija Nieves MD   Urgent Care Provider  [facility]

## 2019-05-14 ENCOUNTER — MYC MEDICAL ADVICE (OUTPATIENT)
Dept: OBGYN | Facility: CLINIC | Age: 24
End: 2019-05-14

## 2019-05-14 DIAGNOSIS — K64.4 EXTERNAL HEMORRHOIDS: ICD-10-CM

## 2019-05-14 DIAGNOSIS — M53.3 PAIN IN THE COCCYX: Primary | ICD-10-CM

## 2019-05-14 RX ORDER — BENZOCAINE/MENTHOL 6 MG-10 MG
LOZENGE MUCOUS MEMBRANE 2 TIMES DAILY
Qty: 60 G | Refills: 3 | Status: SHIPPED | OUTPATIENT
Start: 2019-05-14 | End: 2019-07-22

## 2019-05-14 NOTE — TELEPHONE ENCOUNTER
I will let her know that she is probably experiencing hemorrhoids and home management. Unsure what to recommend for her for the tailbone pain?

## 2019-05-14 NOTE — TELEPHONE ENCOUNTER
Talked to patient.   Having a lot of tailbone pain. Chiropracty not helping. Will try PT.   Has hemorrhoids, does have some constipation but doesn't want to take more medications.   Recommended smooth move tea.   Will send rx for preparation h.

## 2019-05-15 ENCOUNTER — PRENATAL OFFICE VISIT (OUTPATIENT)
Dept: OBGYN | Facility: CLINIC | Age: 24
End: 2019-05-15
Payer: COMMERCIAL

## 2019-05-15 VITALS
WEIGHT: 158 LBS | TEMPERATURE: 97.8 F | DIASTOLIC BLOOD PRESSURE: 83 MMHG | HEART RATE: 121 BPM | SYSTOLIC BLOOD PRESSURE: 129 MMHG | BODY MASS INDEX: 26.29 KG/M2

## 2019-05-15 DIAGNOSIS — O30.043 DICHORIONIC DIAMNIOTIC TWIN PREGNANCY IN THIRD TRIMESTER: Primary | ICD-10-CM

## 2019-05-15 PROCEDURE — 99207 ZZC PRENATAL VISIT: CPT | Performed by: OBSTETRICS & GYNECOLOGY

## 2019-05-15 NOTE — PROGRESS NOTES
31w5d  Low back pain and tailbone pain. Wearing belly support belt. Ankles swollen today.   BP higher than baseline but still normal. Reviewed s/sx pre-eclampsia.   Active fetus x2. Irregular mild contractions. No leaking or bleeding. Reviewed s/sx labor. Appears that presenting twin is breech. Discussed scheduling CS at 38 weeks in case presenting twin is still breech. Discussed if presenting twin turns to cephalic that vaginal delivery would be an option.   RTC as scheduled next week.   Mary Amaya MD

## 2019-05-16 ENCOUNTER — THERAPY VISIT (OUTPATIENT)
Dept: PHYSICAL THERAPY | Facility: CLINIC | Age: 24
End: 2019-05-16
Payer: COMMERCIAL

## 2019-05-16 DIAGNOSIS — M53.3 PAIN IN THE COCCYX: ICD-10-CM

## 2019-05-16 PROCEDURE — 97110 THERAPEUTIC EXERCISES: CPT | Mod: GP | Performed by: PHYSICAL THERAPIST

## 2019-05-16 PROCEDURE — 97535 SELF CARE MNGMENT TRAINING: CPT | Mod: GP | Performed by: PHYSICAL THERAPIST

## 2019-05-16 PROCEDURE — 97161 PT EVAL LOW COMPLEX 20 MIN: CPT | Mod: GP | Performed by: PHYSICAL THERAPIST

## 2019-05-16 PROCEDURE — 97112 NEUROMUSCULAR REEDUCATION: CPT | Mod: GP | Performed by: PHYSICAL THERAPIST

## 2019-05-16 NOTE — PROGRESS NOTES
[facility] Initial Evaluation  Subjective:  Currently 32 weeks gestation with twins, NORM  on  (38 weeks). Onset of severe pain in tailbone/sacrum the past week. Very painful with sitting and getting up out of chair, also worse with walking. BETTER with sleeping/laying down. Bought donut cushion but not helping. Also has been seeing chiro 2x week throughout pregnancy. Adjustment helps for about 1 day. Changes position every hour/moves around in chair.     The history is provided by the patient. No  was used.   Affected Side: coccyx/sacrum.  Occurance: pregnancy.  Condition occurred: at home.  This is a new condition     Site of Pain: B sacrum/coccyx.    Pain is described as aching and sharp and is constant and reported as 4/10 and 8/10.  Associated symptoms:  Pregnancy. Pain is worse during the day.     Since onset symptoms are gradually worsening.    Previous treatment includes chiropractic.  There was mild improvement following previous treatment.  General health as reported by patient is excellent.  Pertinent medical history includes:  Depression.  Medical allergies: no.  Other surgeries include:  No.      Employment status: Call center.                                  Objective:  Standing Alignment:        Lumbar:  Anterior pelvic tilt and lordosis incr  Pelvic:  Normal          Gait:    Gait Type:  Antalgic                    Lumbar/SI Evaluation  ROM:  AROM Lumbar: normal      Lumbar Myotomes:  not assessed            Lumbar DTR's:  not assessed        Lumbar Dermtomes:  not assessed                Neural Tension/Mobility:  Lumbar:  Not assessed        Lumbar Palpation:  Palpation (lumbar): central coccyx and B lower sacrum.            SI joint/Sacrum:          Left negative at:    Forward bend standing    Right negative at:  Forward bend standing                                                 General     ROS    Assessment/Plan:    Patient is a 23  year old female with lumbar and sacral complaints.    Patient has the following significant findings with corresponding treatment plan.                Diagnosis 1:  Coccyx pain  Pain -  hot/cold therapy, manual therapy, splint/taping/bracing/orthotics, self management, education, directional preference exercise and home program  Decreased ROM/flexibility - manual therapy and therapeutic exercise  Decreased joint mobility - manual therapy and therapeutic exercise  Decreased strength - therapeutic exercise and therapeutic activities  Decreased proprioception - neuro re-education and therapeutic activities  Inflammation - cold therapy and self management/home program  Impaired gait - gait training  Impaired muscle performance - neuro re-education  Decreased function - therapeutic activities  Impaired posture - neuro re-education    Therapy Evaluation Codes:   1) History comprised of:   Personal factors that impact the plan of care:      None.    Comorbidity factors that impact the plan of care are:      None.     Medications impacting care: None.  2) Examination of Body Systems comprised of:   Body structures and functions that impact the plan of care:      Lumbar spine and Pelvis.   Activity limitations that impact the plan of care are:      Sitting, Walking and Working.  3) Clinical presentation characteristics are:   Stable/Uncomplicated.  4) Decision-Making    Low complexity using standardized patient assessment instrument and/or measureable assessment of functional outcome.  Cumulative Therapy Evaluation is: Low complexity.    Previous and current functional limitations:  (See Goal Flow Sheet for this information)    Short term and Long term goals: (See Goal Flow Sheet for this information)     Communication ability:  Patient appears to be able to clearly communicate and understand verbal and written communication and follow directions correctly.  Treatment Explanation - The following has been discussed with the  patient:   RX ordered/plan of care  Anticipated outcomes  Possible risks and side effects  This patient would benefit from PT intervention to resume normal activities.   Rehab potential is good.    Frequency:  1 X week, once daily  Duration:  for 6 weeks  Discharge Plan:  Achieve all LTG.  Independent in home treatment program.  Reach maximal therapeutic benefit.    Please refer to the daily flowsheet for treatment today, total treatment time and time spent performing 1:1 timed codes.

## 2019-06-06 ENCOUNTER — TELEPHONE (OUTPATIENT)
Dept: OBGYN | Facility: CLINIC | Age: 24
End: 2019-06-06

## 2019-06-06 NOTE — TELEPHONE ENCOUNTER
Patient calling regarding records to be released. Patient stated that she has sent over an PAWAN already, but nothing in chart. Has also contact our Medical Records department and they instructed her to call us directly for records. Informed her that I wall her verbal authorization over the phone to release all prenatal records with us to her transferring clinic, but she will need to call [facility] or any other clinics that records may be needed from to get them through them directly. Records faxed to: [phone].  Velvet Bateman

## 2019-06-07 ENCOUNTER — RECORDS - HEALTHEAST (OUTPATIENT)
Dept: ADMINISTRATIVE | Facility: OTHER | Age: 24
End: 2019-06-07

## 2019-06-07 ENCOUNTER — OFFICE VISIT - HEALTHEAST (OUTPATIENT)
Dept: MATERNAL FETAL MEDICINE | Facility: HOSPITAL | Age: 24
End: 2019-06-07

## 2019-06-07 ENCOUNTER — RECORDS - HEALTHEAST (OUTPATIENT)
Dept: ULTRASOUND IMAGING | Facility: HOSPITAL | Age: 24
End: 2019-06-07

## 2019-06-07 DIAGNOSIS — O30.043 TWIN PREGNANCY, DICHORIONIC/DIAMNIOTIC, THIRD TRIMESTER: ICD-10-CM

## 2019-06-07 DIAGNOSIS — O30.043 DICHORIONIC DIAMNIOTIC TWIN PREGNANCY IN THIRD TRIMESTER: ICD-10-CM

## 2019-06-21 ENCOUNTER — RECORDS - HEALTHEAST (OUTPATIENT)
Dept: ADMINISTRATIVE | Facility: OTHER | Age: 24
End: 2019-06-21

## 2019-06-21 ENCOUNTER — HOSPITAL ENCOUNTER (OUTPATIENT)
Dept: MEDSURG UNIT | Facility: CLINIC | Age: 24
Discharge: HOME | End: 2019-06-21
Attending: OBSTETRICS & GYNECOLOGY | Admitting: OBSTETRICS & GYNECOLOGY

## 2019-06-26 ENCOUNTER — ANESTHESIA - HEALTHEAST (OUTPATIENT)
Dept: OBGYN | Facility: CLINIC | Age: 24
End: 2019-06-26

## 2019-06-28 ENCOUNTER — HOME CARE/HOSPICE - HEALTHEAST (OUTPATIENT)
Dept: HOME HEALTH SERVICES | Facility: HOME HEALTH | Age: 24
End: 2019-06-28

## 2019-07-02 ENCOUNTER — COMMUNICATION - HEALTHEAST (OUTPATIENT)
Dept: SCHEDULING | Facility: CLINIC | Age: 24
End: 2019-07-02

## 2019-07-22 ENCOUNTER — OFFICE VISIT (OUTPATIENT)
Dept: OBGYN | Facility: CLINIC | Age: 24
End: 2019-07-22
Payer: COMMERCIAL

## 2019-07-22 VITALS
BODY MASS INDEX: 20.89 KG/M2 | SYSTOLIC BLOOD PRESSURE: 111 MMHG | HEART RATE: 83 BPM | DIASTOLIC BLOOD PRESSURE: 76 MMHG | WEIGHT: 130 LBS | HEIGHT: 66 IN | TEMPERATURE: 96.3 F | OXYGEN SATURATION: 96 %

## 2019-07-22 DIAGNOSIS — R30.0 DYSURIA: ICD-10-CM

## 2019-07-22 DIAGNOSIS — D64.89 ANEMIA DUE TO OTHER CAUSE, NOT CLASSIFIED: Primary | ICD-10-CM

## 2019-07-22 PROBLEM — Z34.91 PRENATAL CARE IN FIRST TRIMESTER: Status: RESOLVED | Noted: 2018-12-23 | Resolved: 2019-07-22

## 2019-07-22 PROBLEM — Z23 NEED FOR TDAP VACCINATION: Status: RESOLVED | Noted: 2018-11-28 | Resolved: 2019-07-22

## 2019-07-22 PROBLEM — O30.041 DICHORIONIC DIAMNIOTIC TWIN PREGNANCY IN FIRST TRIMESTER: Status: RESOLVED | Noted: 2018-12-23 | Resolved: 2019-07-22

## 2019-07-22 LAB
ALBUMIN UR-MCNC: NEGATIVE MG/DL
APPEARANCE UR: CLEAR
BILIRUB UR QL STRIP: NEGATIVE
COLOR UR AUTO: YELLOW
GLUCOSE UR STRIP-MCNC: NEGATIVE MG/DL
HGB UR QL STRIP: NEGATIVE
KETONES UR STRIP-MCNC: NEGATIVE MG/DL
LEUKOCYTE ESTERASE UR QL STRIP: NEGATIVE
NITRATE UR QL: NEGATIVE
PH UR STRIP: 5.5 PH (ref 5–7)
SOURCE: NORMAL
SP GR UR STRIP: >1.03 (ref 1–1.03)
UROBILINOGEN UR STRIP-ACNC: 0.2 EU/DL (ref 0.2–1)

## 2019-07-22 PROCEDURE — 81003 URINALYSIS AUTO W/O SCOPE: CPT | Performed by: OBSTETRICS & GYNECOLOGY

## 2019-07-22 ASSESSMENT — MIFFLIN-ST. JEOR: SCORE: 1351.43

## 2019-07-22 ASSESSMENT — PATIENT HEALTH QUESTIONNAIRE - PHQ9: SUM OF ALL RESPONSES TO PHQ QUESTIONS 1-9: 2

## 2019-07-22 NOTE — PROGRESS NOTES
"FELIPE Cole is a 24 year old female presents for post operative check. She is  3.5  week(s) status post  of twins.  She reports doing well and denies significant pain or bleeding.  Does not feel the urge to void and when she does, notes some burning as the urine hits the outside.  Did have a tear with her delivery and is planning a vacation to the [facility] in 2 weeks.  Wanted to have examination today to make sure she can swim.    O.  Blood pressure 111/76, pulse 83, temperature 96.3  F (35.7  C), temperature source Oral, height 1.676 m (5' 6\"), weight 59 kg (130 lb), last menstrual period 10/05/2018, SpO2 96 %, currently breastfeeding.    Abd: soft, non-tender, non-distended.  Perineum is well-healed and no stitches present.  vagina is very atrophic.    A. /P.  (D64.89) Anemia due to other cause, not classified  (primary encounter diagnosis)  Comment: 8.0 at discharge  Plan: OB hemoglobin, CANCELED: OB hemoglobin        Check hemoglobin today to see if she can stop taking iron.    (R30.0) Dysuria  Plan: *UA reflex to Microscopic        Results discussed.  Discussed vaginal atrophy is probably what she is feeling.       Follow up 3-4 weeks for postpartum exam.  Birth control options discussed and given information to read about ParaGard.  Discussed fine for ArthroCAD and swimming.    Luciana Wolf MD    "

## 2019-08-05 ENCOUNTER — OFFICE VISIT (OUTPATIENT)
Dept: URGENT CARE | Facility: URGENT CARE | Age: 24
End: 2019-08-05
Payer: COMMERCIAL

## 2019-08-05 VITALS
WEIGHT: 125 LBS | SYSTOLIC BLOOD PRESSURE: 102 MMHG | TEMPERATURE: 97.5 F | HEART RATE: 100 BPM | BODY MASS INDEX: 20.18 KG/M2 | OXYGEN SATURATION: 96 % | DIASTOLIC BLOOD PRESSURE: 70 MMHG

## 2019-08-05 DIAGNOSIS — N64.59 BREAST ENGORGEMENT: ICD-10-CM

## 2019-08-05 DIAGNOSIS — K62.5 RECTAL BLEEDING: Primary | ICD-10-CM

## 2019-08-05 PROCEDURE — 99214 OFFICE O/P EST MOD 30 MIN: CPT | Performed by: FAMILY MEDICINE

## 2019-08-05 NOTE — PROGRESS NOTES
SUBJECTIVE: Liliam Cole is a 24 year old female presenting with a chief complaint of rectal lump and bleeding with BM occasionally after giving birth.  Also fullness with breasts  Onset of symptoms was day(s) ago.  Course of illness is same.    Severity moderate  Current and Associated symptoms: none  Treatment measures tried include None tried.  Predisposing factors include s/p birth. And occasional constipation    Past Medical History:   Diagnosis Date     NO ACTIVE PROBLEMS      No Known Allergies  Social History     Tobacco Use     Smoking status: Never Smoker     Smokeless tobacco: Never Used   Substance Use Topics     Alcohol use: No       ROS:  SKIN: no rash  GI: no vomiting    OBJECTIVE:  /70   Pulse 100   Temp 97.5  F (36.4  C) (Oral)   Wt 56.7 kg (125 lb)   SpO2 96%   BMI 20.18 kg/m     GENERAL APPEARANCE: healthy, alert and no distress  ABDOMEN:  soft, nontender, no HSM or masses and bowel sounds normal  SKIN: no suspicious lesions or rashes  Breast fullness, no pain/redness/warmth  RECTAL no hemorrhoids, pain      ICD-10-CM    1. Rectal bleeding K62.5 COLORECTAL SURGERY REFERRAL   2. Breast engorgement N64.59      Stool softner increase fiber and fluids  Fluids/Rest, f/u if worse/not any better

## 2019-08-17 ENCOUNTER — NURSE TRIAGE (OUTPATIENT)
Dept: NURSING | Facility: CLINIC | Age: 24
End: 2019-08-17

## 2019-08-17 NOTE — TELEPHONE ENCOUNTER
"Pt has a funny sensation in her breast that is there when breastfeeding. She has a set of twin girls, 2 months old. She thinks it might be thrush that she and one twin is passing back and forth. Advised to be seen in  today with infant for evaluation. She refused and wanted appt instead. Warm transfer done to scheduling.     Eva Serra RN, [facility]      Reason for Disposition    [1] Breast pain AND [2] cause is not known    Additional Information    Negative: Chest pain    Negative: Patient is breastfeeding    Negative: Postpartum breast pain and swelling, not breastfeeding    Negative: Small spot, skin growth or mole    Negative: [1] SEVERE breast pain AND [2] fever > 103 F (39.4 C)    Negative: Patient sounds very sick or weak to the triager    Negative: [1Breast looks infected (spreading redness, feels hot or painful to touch) AND [2] fever    Negative: [1Breast looks infected (spreading redness, feels hot or painful to touch) AND [2] no fever    Negative: [1] Painful rash AND [2] multiple small blisters grouped together (i.e., dermatomal distribution or \"band\" or \"stripe\")    Negative: [1] Cuts, burns, or bruises of breasts AND [2] suspicious history for the injury    Negative: Breast lump    Negative: [1] Nipple discharge AND [2] bloody    Negative: Nipple is inverted (i.e., points inward)  (Exception: long-term physical characteristic, present for many years)    Negative: Dry flaking-peeling skin of nipple    Negative: Change in shape or appearance of breast    Negative: Dimpling of skin of breast  (i.e., skin looks like the outside of an orange peel)    Negative: [1] Breast tenderness and fullness AND [2] pregnant    Negative: [1] Nipple discharge AND [2] not bloody (e.g., clear, white, yellow, brown, green)    Protocols used: BREAST SYMPTOMS-A-AH      "

## 2019-08-20 ENCOUNTER — OFFICE VISIT (OUTPATIENT)
Dept: INTERNAL MEDICINE | Facility: CLINIC | Age: 24
End: 2019-08-20
Payer: COMMERCIAL

## 2019-08-20 VITALS
HEIGHT: 66 IN | HEART RATE: 90 BPM | OXYGEN SATURATION: 100 % | WEIGHT: 122.7 LBS | SYSTOLIC BLOOD PRESSURE: 96 MMHG | TEMPERATURE: 98.1 F | DIASTOLIC BLOOD PRESSURE: 70 MMHG | RESPIRATION RATE: 20 BRPM | BODY MASS INDEX: 19.72 KG/M2

## 2019-08-20 DIAGNOSIS — O92.70 LACTATION PROBLEM: ICD-10-CM

## 2019-08-20 DIAGNOSIS — N64.4 BREAST PAIN: Primary | ICD-10-CM

## 2019-08-20 PROCEDURE — 99213 OFFICE O/P EST LOW 20 MIN: CPT | Performed by: INTERNAL MEDICINE

## 2019-08-20 RX ORDER — PRENATAL VIT/IRON FUM/FOLIC AC 27MG-0.8MG
1 TABLET ORAL DAILY
COMMUNITY
Start: 2019-08-20 | End: 2022-08-29

## 2019-08-20 ASSESSMENT — MIFFLIN-ST. JEOR: SCORE: 1318.31

## 2019-08-20 NOTE — NURSING NOTE
"BP 96/70 (BP Location: Right arm, Patient Position: Sitting, Cuff Size: Adult Regular)   Pulse 90   Temp 98.1  F (36.7  C) (Oral)   Resp 20   Ht 1.676 m (5' 6\")   Wt 55.7 kg (122 lb 11.2 oz)   LMP  (LMP Unknown)   SpO2 100%   Breastfeeding? Yes   BMI 19.80 kg/m    Pap smear discussed-she does not think she us up-to-date at this time, but she plans to schedule and will ask them to send the results here when she does.   Crystal Dominguez CMA    "

## 2019-08-20 NOTE — PROGRESS NOTES
Dr Waters's note    Dr Waters's note        ASSESSMENT & PLAN:                                                      (N64.4) Breast pain  (primary encounter diagnosis)  (O92.70) Lactation problem  Comment: I see no breast abnormalities   Plan: I advised her to take a warm shower or to put warm towels before the breast feeding  I advised her to have a better support bras.  She will call lactation nurse at her birth center for more advice. She will f/u w pediatrician for the girl thrush        Chief complaint:                                                      Breast Problem    SUBJECTIVE:                                                    History of present illness:    Breast Problem  --  Unknown OBGYN   --  1-2 weeks after birth, she had no problem with breast feeding  --  She noticed white on tongue of one of her daughters - given Abx and subsided  --  Shortly after, she experienced shooting pain in the upper-lateral part her breasts when she starts breastfeeding  --  breast pads often changed, she hasn't noticed any skin changes.   --  Daughter began to get thrush again. She is worried she has yeast infection in her breast and breast milk.   --  She went to  who concluded engorgement but she does not think so   --  No cracked nipples, she just experiences shooting pain  --  No lactation issues  --  She does not know who to talk to -- advised lactation nurse at [facility] where she gave birth             Review of Systems:                                                      ROS: negative for fever, chills, cough, wheezes, chest pain, shortness of breath, vomiting, abdominal pain, leg swelling     This document serves as a record of the services and decisions personally performed and made by Jt Fox MD. It was created on her behalf by Elizabeth Chiang, a trained medical scribe. The creation of this document is based on the provider's statements to the medical scribe.  Elizabeth Chiang August  "20, 2019 12:18 PM          OBJECTIVE:         Physical exam:  BP 96/70 (BP Location: Right arm, Patient Position: Sitting, Cuff Size: Adult Regular)   Pulse 90   Temp 98.1  F (36.7  C) (Oral)   Resp 20   Ht 1.676 m (5' 6\")   Wt 55.7 kg (122 lb 11.2 oz)   LMP  (LMP Unknown)   SpO2 100%   Breastfeeding? Yes   BMI 19.80 kg/m     NAD, appears comfortable  Skin: no rashes   Neck: supple, no JVD,  No thyroidmegaly. Lymph nodes nonpalpable cervical and supraclavicular.  Chest: clear to auscultation bilaterally, good respiratory effort  Heart: S1 S2, RRR, no mgr appreciated  Abdomen: soft, not tender,   Extremities: no edema,   Neurologic: A, Ox3, no focal signs appreciated  Breast exam in supine and erect position: they are symmetrical, no skin changes, no tenderness or nodes on palpation. Nipples are erect, no skin lesions,    PMHx: reviewed  Past Medical History:   Diagnosis Date     NO ACTIVE PROBLEMS       PSHx: reviewed  Past Surgical History:   Procedure Laterality Date     NO HISTORY OF SURGERY          Meds: reviewed  Current Outpatient Medications   Medication Sig Dispense Refill     Prenatal Vit-Fe Fumarate-FA (PRENATAL MULTIVITAMIN W/IRON) 27-0.8 MG tablet Take 1 tablet by mouth daily         Soc Hx: reviewed  Fam Hx: reviewed      The information in this document, created by the medical scribe for me, accurately reflects the services I personally performed and the decisions made by me. I have reviewed and approved this document for accuracy prior to leaving the patient care area.  August 20, 2019 12:26 PM       Dominique Waters MD  Internal Medicine         "

## 2019-08-20 NOTE — Clinical Note
Pt had negative gonorrhea and chlamydia screenings done 10/5/18 through [facility] (results in Care Everywhere). If she's not in the age range that this is required, then don't worry about it-no need to send it back.

## 2020-03-02 ENCOUNTER — HEALTH MAINTENANCE LETTER (OUTPATIENT)
Age: 25
End: 2020-03-02

## 2021-04-16 ENCOUNTER — ANESTHESIA - HEALTHEAST (OUTPATIENT)
Dept: OBGYN | Facility: CLINIC | Age: 26
End: 2021-04-16

## 2021-04-16 ENCOUNTER — COMMUNICATION - HEALTHEAST (OUTPATIENT)
Dept: SCHEDULING | Facility: CLINIC | Age: 26
End: 2021-04-16

## 2021-05-27 NOTE — PROGRESS NOTES
"Please see \"Imaging\" tab under \"Chart Review\" for details of today's visit.    Shaina Claros        "

## 2021-05-27 NOTE — PROGRESS NOTES
[facility] OB Triage    Subjective: Liliam Cole is a  23 y.o. female at 24w6d with a current prenatal history significant for GBS colonizer, pyelonephritis in pregnancy on UTI prophylaxis who presents to OB triage with RLQ pain. Patient reports oset of symptoms 2 hours before presentation. Endorses sharp intermittent pain rated 7/10 in lower quadrant. Cannot identify alleviating or aggravating factors but symptoms did resolve spontaneously after she laid down flat. She currently has no symptoms and feels comfortable. Denies dysuria, hematuria, nausea, vomiting, fever, chills, diarrhea, worsening constipation. No contractions, vaginal discharge, bleeding, leaking fluid, decreased fetal movement. Has not missed any doses of daily Keflex.     Estimated Date of Delivery: 19 Patient's last menstrual period was 10/05/2018.  OB provider: [facility]  OB History      Para Term  AB Living    1              SAB TAB Ectopic Multiple Live Births                     Objective:   Blood pressure 117/82, pulse 81, temperature 98.2  F (36.8  C), temperature source Oral, resp. rate 16, last menstrual period 10/05/2018.  General:   alert, appears stated age and cooperative   Skin:   normal   HEENT:  extra ocular movement intact and sclera clear, anicteric   Lungs:   clear to auscultation bilaterally   Heart:   regular rate and rhythm, S1, S2 normal, no murmur, click, rub or gallop   Extremities: Warm, dry and well perfused. no edema.   Neuro: Reflexes 2+ and symmetric.    Abdomen: FHT present   Membranes intact   Colman:  None   FHT: 147 bpm, 153 bpm (twin gestation) by Doppler at bedside     Laboratory Studies:   Results for orders placed or performed during the hospital encounter of 19   Urinalysis-UC if Indicated   Result Value Ref Range    Color, UA Yellow Colorless, Yellow, Straw, Light Yellow    Clarity, UA Cloudy (!) Clear    Glucose, UA Negative Negative     Bilirubin, UA Negative Negative    Ketones, UA Negative Negative    Specific Gravity, UA 1.005 1.001 - 1.030    Blood, UA Negative Negative    pH, UA 8.0 4.5 - 8.0    Protein, UA Negative Negative mg/dL    Urobilinogen, UA <2.0 E.U./dL <2.0 E.U./dL, 2.0 E.U./dL    Nitrite, UA Negative Negative    Leukocytes, UA Large (!) Negative    Bacteria, UA Few (!) None Seen hpf    RBC, UA 0-2 None Seen, 0-2 hpf    WBC, UA  (!) None Seen, 0-5 hpf    Squam Epithel, UA  (!) None Seen, 0-5 lpf    WBC Clumps Present (!) None Seen        ASSESSMENT AND PLAN: Liliam Cole is a  23 y.o. female who presented to [facility] at 24w6d on 3/28/2019 with RLQ pain that spontaneously resolved. Currently feeling comfortable. She does have history of pyelo- this pregnancy and is currently on Keflex prophylaxis but denies any missed doses. ROS is non-contributory and vitals stable, exam unremarkable, FHT in normal range for both. U/A shows WBCs, squamous cells, - nitrites. Culture pending.  1. Not in labor.  2. RLQ pain, resolved. Symptoms likely secondary to MSK etiology. U/A is dirty v infectious, however the first is more reasonable due to aforementioned reasons. Discussed treating any recurrence of symptoms with warm packs, tylenol prn and moving up OB appt if needed. Next visit scheduled in 2 weeks. Discussed return precautions, especially any development of dysuria, fever, chills, vomiting, recurrent abdominal pain. Patient verbalizes understanding.  1. Follow up urine culture - patient to be contacted if any infectious growth.       Patient dicussed with attending physician, Dr.Darin Campos, who agrees with the plan.      AGUILAR LOWRY PGY2 3/28/2019  [facility]

## 2021-05-27 NOTE — PLAN OF CARE
Let resident know that patient is here for pain on the lower right side.  She is 24 weeks pregnant with di/di twins.  Was not able to monitor babies.  Could hear the moving patient states they have to usually ultrasound her for heart rates right now.  Residents will make further plan now.

## 2021-05-28 NOTE — PROGRESS NOTES
"Please see \"Imaging\" tab under Chart Review for full report.  This ultrasound was performed in the [facility], and may be located under Care Everywhere.    Julissa Stock MD  Maternal Fetal Medicine    "

## 2021-05-29 NOTE — PROGRESS NOTES
"Liliam is a 37w 1/0000 here to rule out labor.  Liliam is stating yesterday at her doctor appointment, Dr. Genao stripped her membranes.  Ever since, Liliam is stating she has been having contractions.  She is stating the contractions are \"not nearly as uncomfortable as yesterday\". She denies SROM, bleeding or concern with baby movement.  Liliam is remarking she is feeling \"a lot of vaginal pressure\" as well.  Liliam is able to talk through the contractions, observed by the RN.      SVE by RN as noted.  Initial BP elevated.  BP set to cycle every 10 mins.  Liliam denies having any elevated blood pressures this pregnancy.  Liliam denies any visual changes, RUQ discomfort or HA.  "

## 2021-05-29 NOTE — PROGRESS NOTES
"SVE by RN, unchanged.  Pt stating she has been sleeping and \"isn't sure if the contractions are more uncomfortable\".  Dr. Holly called, notified of patient and current status.  Discharge orders received.  "

## 2021-05-30 NOTE — TELEPHONE ENCOUNTER
Pt called in states she has hive.  The hive is all over her abdomin, her back buttocks the top of her feet.  The rash look like bumpy and very raised.  The color is very red.  The size of half dollars.  The hives started a week ago.  Pt states she had a baby 6 days ago.  Pt was on pitocin while she was at the hospital.   The itching is 15/10 on the scale.  Pt states she didn't have this kind of hive in the past.  No fever, no tongue swelling, no difficulty breathing, no abdominal pain.  The disposition is home care.  Care advice given per protocol.  Pt states she will call if the symptom persisted after she took antihistamine.  Patient agrees with care advice given.   Agreed to call back if he has additional symptoms or questions.      Abhinav Reinoso RN, [facility] Triage/Med Refill 7/2/2019 9:55 PM      Reason for Disposition    Widespread hives    Protocols used: HIVES-A-

## 2021-05-30 NOTE — ANESTHESIA PREPROCEDURE EVALUATION
Anesthesia Evaluation      Patient summary reviewed   No history of anesthetic complications     Airway    Pulmonary - negative ROS                          Cardiovascular - negative ROS   Neuro/Psych - negative ROS     Endo/Other    (+) pregnant     GI/Hepatic/Renal - negative ROS      Other findings: Twins      Dental              Patient requesting labor epidural, chart reviewed.  Discussed risks including hypotension, nerve damage, infection, and post dural puncture headache.  Patient understands, questions answered, and agrees to proceed.  Time out instituted prior to placement. Hands washed, sterile gloves and mask worn.               Anesthesia Plan  Planned anesthetic: epidural    ASA 2     Anesthetic plan and risks discussed with: patient

## 2021-05-30 NOTE — ANESTHESIA PROCEDURE NOTES
Epidural Block    Patient location during procedure: OB  Time Called: 6/26/2019 3:18 AM  Reason for Block:labor epidural  Staffing:  Performing  Anesthesiologist: Roby Merida MD  Preanesthetic Checklist  Completed: patient identified, risks, benefits, and alternatives discussed, timeout performed, consent obtained, at patient's request, airway assessed, oxygen available, suction available, emergency drugs available and hand hygiene performed  Procedure  Patient position: sitting  Prep: ChloraPrep  Patient monitoring: blood pressure, heart rate and continuous pulse oximetry  Approach: midline  Location: L3-L4  Injection technique: LILIBETH saline (PFNS)  Number of Attempts:1  Needle  Needle type: [facility] [employer]   Needle gauge: 18 G     Catheter in Space: 4      Additional Notes:  Negative CSF, heme, paresthesia

## 2021-05-30 NOTE — ANESTHESIA POSTPROCEDURE EVALUATION
Patient: Liliam Cole  * No procedures listed *  Anesthesia type: epidural    Patient location: Labor and Delivery  Last vitals:   Vitals Value Taken Time   /83 6/26/2019  6:18 AM   Temp      6/26/2019  6:23 AM   Pulse 75 6/26/2019  6:18 AM   Resp  6/26/2019  6:23 AM   SpO2  6/26/2019  6:23 AM     Post vital signs: stable  Level of consciousness: awake and responds to simple questions  Post-anesthesia pain: pain controlled  Post-anesthesia nausea and vomiting: no  Pulmonary: unassisted, return to baseline  Cardiovascular: stable and blood pressure at baseline  Hydration: adequate  Anesthetic events: no    QCDR Measures:  ASA# 11 - Kadi-op Cardiac Arrest: ASA11B - Patient did NOT experience unanticipated cardiac arrest  ASA# 12 - Kadi-op Mortality Rate: ASA12B - Patient did NOT die  ASA# 13 - PACU Re-Intubation Rate: NA - No ETT / LMA used for case  ASA# 10 - Composite Anes Safety: ASA10A - No serious adverse event    Additional Notes:

## 2021-06-03 VITALS — BODY MASS INDEX: 25.18 KG/M2 | HEIGHT: 66 IN

## 2021-06-16 NOTE — ANESTHESIA POSTPROCEDURE EVALUATION
Patient: Liliam Cole  Anesthesia type: epidural    Patient location: Labor and Delivery  Last vitals:   Pulse Readings from Last 1 Encounters:   04/16/21 98     SpO2 Readings from Last 1 Encounters:   04/16/21 100%     BP Readings from Last 1 Encounters:   04/16/21 94/53     Resp Readings from Last 1 Encounters:   04/16/21 14     Temp Readings from Last 1 Encounters:   04/16/21 36.7  C (98  F) (Oral)       Post vital signs: stable  Level of consciousness: awake and responds to simple questions  Post-anesthesia pain: pain controlled  Post-anesthesia nausea and vomiting: no  Pulmonary: unassisted, return to baseline  Cardiovascular: stable and blood pressure at baseline  Hydration: adequate  Anesthetic events: no    QCDR Measures:  ASA# 11 - Kadi-op Cardiac Arrest: ASA11B - Patient did NOT experience unanticipated cardiac arrest  ASA# 12 - Kadi-op Mortality Rate: ASA12B - Patient did NOT die  ASA# 13 - PACU Re-Intubation Rate: NA - No ETT / LMA used for case  ASA# 10 - Composite Anes Safety: ASA10A - No serious adverse event    Additional Notes:  Neuraxial block has worn off completely. Pain controlled. Denies headache or back pain.

## 2021-06-16 NOTE — ANESTHESIA PROCEDURE NOTES
Epidural Block    Patient location during procedure: OB  Time Called: 4/16/2021 12:10 AM  Reason for Block:labor epidural  Staffing:  Performing  Anesthesiologist: Hoang Wisdom MD  Preanesthetic Checklist  Completed: patient identified, risks, benefits, and alternatives discussed, timeout performed, consent obtained, airway assessed, oxygen available, suction available, emergency drugs available and hand hygiene performed  Procedure  Patient position: sitting  Prep: ChloraPrep  Patient monitoring: continuous pulse oximetry, blood pressure and heart rate  Approach: midline  Location: L1-L2  Injection technique: LILIBETH saline  Number of Attempts:1  Needle  Needle type: Jessika   Needle gauge: 18 G     Catheter in Space: 4  Assessment  Sensory level: T8  No complications

## 2021-06-19 NOTE — LETTER
Letter by Roberta Lowry MD at      Author: Roberta Lowry MD Service: -- Author Type: --    Filed:  Date of Service:  Status: (Other)       March 28, 2019     Patient: Liliam Cole   YOB: 1995   Date of Visit: 3/28/2019       To Whom It May Concern:    Liliam Cole was seen today and treated at our facility. She may return to work without restrictions.     If you have any questions or concerns, please don't hesitate to call.    Sincerely,        ROBERTA LOWRY MD

## 2021-06-22 NOTE — PROGRESS NOTES
[facility]  Genetic Counseling Consult    Patient: Liliam Cole YOB: 1995   Date of Service: 2019      Liliam Cole was seen at [facility] for genetic consultation to discuss the options for screening and testing for fetal chromosome abnormalities.  The indication for genetic counseling is screening options for aneuploidy in twin pregnancy.        Impression/Plan:   1.  Liliam had an ultrasound today, and opted to decline serum screening at this time.  Liliam understands that there are other screening options available later in pregnancy, and reports that she would likely opt for diagnostic testing instead of screening if any abnormalities were identified on ultrasound.    2.  Maternal serum AFP (single marker screen) is recommended after 15 weeks to screen for open neural tube defects.     3.  An 18-20 week comprehensive ultrasound is available to screen for birth defects and markers of aneuploidy.    Pregnancy History:   /Parity:    Age at Delivery: 24 y.o.  NORM: 2019, by Last Menstrual Period  Gestational Age: 12w5d    No significant complications or exposures were reported in the current pregnancy.    Medical History:   Liliam calderon reported medical history is not expected to impact pregnancy management or risks to fetal development.       Family History:   A three-generation pedigree was obtained, and is scanned under the  Media  tab.   The reported family history is negative for multiple miscarriages, stillbirths, birth defects, mental retardation, known genetic conditions, and consanguinity.       Carrier Screening:   The patient reports that she and the father of the pregnancy have  ancestry:      We reviewed the clinical features, autosomal recessive inheritance, and options for carrier screening and  screening for hemoglobinopathies.      Expanded carrier screening for  mutations in a large panel of genes associated with autosomal recessive conditions including cystic fibrosis, spinal muscular atrophy, and others, is now available.      The patient has declined the carrier screening options reviewed today.       Risk Assessment for Chromosome Conditions:   We explained that the risk for fetal chromosome abnormalities increases with maternal age. We discussed specific features of common chromosome abnormalities, including Down syndrome, trisomy 13, trisomy 18, and sex chromosome trisomies.      - At age 23 at midtrimester, the risk to have a baby with Down syndrome is 1 in 1087.    - At age 23 at midtrimester, the risk to have a baby with any chromosome abnormality is 1 in 544.     The numbers discussed today reflect risks for a single conception.  In a monozygotic twin pregnancy, both twins come from the same conception and are expected to have the same chromosome complement.  The risks above would reflect the risk for both twins in a monozygotic twin pregnancy to be affected.  In a dizygotic twin pregnancy, the twins each result from a separate conception, and the risks above would reflect the risk for each individual twin to be affected.  Zygosity cannot be definitively determined via ultrasound, but dichorionic/diamniotic twin pregnancies are more likely to be dizygotic.           Testing Options:   We discussed the following options:   First trimester screening    First trimester ultrasound with nuchal translucency and nasal bone assessments, maternal plasma hCG, MARGUERITE-A, and AFP measurement    Screens for fetal trisomy 21, trisomy 13, and trisomy 18    Cannot screen for open neural tube defects; maternal serum AFP after 15 weeks is recommended    ,  Non-invasive Prenatal Testing (NIPT)    Maternal plasma cell-free DNA testing; first trimester ultrasound with nuchal translucency and nasal bone assessment is recommended, when appropriate    Screens for fetal trisomy 21, trisomy  13, trisomy 18, and sex chromosome aneuploidy    Cannot screen for open neural tube defects; maternal serum AFP after 15 weeks is recommended      ,  Genetic Amniocentesis    Invasive procedure typically performed in the second trimester by which amniotic fluid is obtained for the purpose of chromosome analysis and/or other prenatal genetic analysis    Diagnostic results; >99% sensitivity for fetal chromosome abnormalities    AFAFP measurement tests for open neural tube defects      Comprehensive (Level II) ultrasound:     Detailed ultrasound performed between 18-22 weeks gestation    Screen for major birth defects and markers for aneuploidy      We reviewed the benefits and limitations of this testing.  Screening tests provide a risk assessment specific to the pregnancy for certain fetal chromosome abnormalities, but cannot definitively diagnose or exclude a fetal chromosome abnormality.  Follow-up genetic counseling and consideration of diagnostic testing is recommended with any abnormal screening result. Diagnostic tests carry inherent risks- including risk of miscarriage- that require careful consideration.  These tests can detect fetal chromosome abnormalities with greater than 99% certainty.  Results can be compromised by maternal cell contamination or mosaicism, and are limited by the resolution of cytogenetic G-banding technology.  There is no screening nor diagnostic test that can detect all forms of birth defects or mental disability.     It was a pleasure to be involved with Liliam s care. Face-to-face time of the meeting was 30 minutes.    Hector Robbins MS, [facility]  Licensed Genetic Counselor  Phone: [phone]  Pager: [phone]

## 2021-06-22 NOTE — TELEPHONE ENCOUNTER
1/4- pt r/s back to [facility] location.   Kip Ortiz    Patient will be having L2 Twins US done at [facility].    PARTH Baldwin

## 2021-06-23 NOTE — PROGRESS NOTES
"Please see \"Imaging\" tab under \"Chart Review\" for details of today's US.    Yolie Nance, DO    "

## 2021-07-03 NOTE — ADDENDUM NOTE
Addendum Note by Nirmala Cruz RN at 5/10/2019  2:45 PM     Author: Nirmala Cruz RN Service: -- Author Type: Registered Nurse    Filed: 5/10/2019  3:53 PM Encounter Date: 5/10/2019 Status: Signed    : Nirmala Cruz RN (Registered Nurse)    Addended by: NIRMALA CRUZ on: 5/10/2019 03:53 PM        Modules accepted: Orders

## 2021-07-14 PROBLEM — Z34.90 PREGNANT: Status: RESOLVED | Noted: 2019-06-25 | Resolved: 2021-04-17

## 2021-10-07 PROBLEM — O23.02 PYELONEPHRITIS AFFECTING PREGNANCY IN SECOND TRIMESTER: Status: ACTIVE | Noted: 2019-02-27

## 2021-10-07 PROBLEM — O99.820 GBS (GROUP B STREPTOCOCCUS CARRIER), +RV CULTURE, CURRENTLY PREGNANT: Status: ACTIVE | Noted: 2019-02-27

## 2022-01-06 ENCOUNTER — TELEPHONE (OUTPATIENT)
Dept: NURSING | Facility: CLINIC | Age: 27
End: 2022-01-06
Payer: COMMERCIAL

## 2022-01-06 NOTE — TELEPHONE ENCOUNTER
"Telephone call:     Patient calling to report that she is Covid positive and is wondering about when she can end the quarantine, as she has heard that the quarantine has decreased to 5 days for some. She has had symptoms of fever, chills, body aches, sore throat, congestion and cough. The only symptom that is lingering is the cough and she is on day 8 of quarantine. Because she is still symptomatic, she was instructed to continue the 10 day quarantine.     Liliam verbalizes understanding and plans to continue her quarantine. No further questions. No triage.     Naomi Murdock RN   01/06/22 11:13 AM  [facility] Nurse Advisor    COVID 19 Nurse Triage Plan/Patient Instructions    Please be aware that novel coronavirus (COVID-19) may be circulating in the community. If you develop symptoms such as fever, cough, or SOB or if you have concerns about the presence of another infection including coronavirus (COVID-19), please contact your health care provider or visit [email or web address].     Disposition/Instructions    Additional COVID19 information to add for patients.   How can I protect others?  If you have symptoms (fever, cough, body aches or trouble breathing): Stay home and away from others (self-isolate) until:    At least 10 days have passed since your symptoms started, And     You ve had no fever--and no medicine that reduces fever--for 1 full day (24 hours), And      Your other symptoms have resolved (gotten better).     If you don t have symptoms, but a test showed that you have COVID-19 (you tested positive):    Stay home and away from others (self-isolate). Follow the tips under \"How do I self-isolate?\" below for 10 days (20 days if you have a weak immune system).    You don't need to be retested for COVID-19 before going back to school or work. As long as you're fever-free and feeling better, you can go back to school, work and other activities after waiting the 10 or 20 days.     How do I " self-isolate?    Stay in your own room, even for meals. Use your own bathroom if you can.     Stay away from others in your home. No hugging, kissing or shaking hands. No visitors.    Don t go to work, school or anywhere else.     Clean  high touch  surfaces often (doorknobs, counters, handles, etc.). Use a household cleaning spray or wipes. You ll find a full list on the EPA website:  www.epa.gov/pesticide-registration/list-n-disinfectants-use-against-sars-cov-2.    Cover your mouth and nose with a mask, tissue or washcloth to avoid spreading germs.    Wash your hands and face often. Use soap and water.    Caregivers in these groups are at risk for severe illness due to COVID-19:  o People 65 years and older  o People who live in a nursing home or long-term care facility  o People with chronic disease (lung, heart, cancer, diabetes, kidney, liver, immunologic)  o People who have a weakened immune system, including those who:  - Are in cancer treatment  - Take medicine that weakens the immune system, such as corticosteroids  - Had a bone marrow or organ transplant  - Have an immune deficiency  - Have poorly controlled HIV or AIDS  - Are obese (body mass index of 40 or higher)  - Smoke regularly    Caregivers should wear gloves while washing dishes, handling laundry and cleaning bedrooms and bathrooms.    Use caution when washing and drying laundry: Don t shake dirty laundry, and use the warmest water setting that you can.    For more tips, go to www.cdc.gov/coronavirus/2019-ncov/downloads/10Things.pdf.    How can I take care of myself?  1. Get lots of rest. Drink extra fluids (unless a doctor has told you not to).     2. Take Tylenol (acetaminophen) for fever or pain. If you have liver or kidney problems, ask your family doctor if it s okay to take Tylenol.     Adults can take either:     650 mg (two 325 mg pills) every 4 to 6 hours, or     1,000 mg (two 500 mg pills) every 8 hours as needed.     Note: Don t take  more than 3,000 mg in one day.   Acetaminophen is found in many medicines (both prescribed and over-the-counter medicines). Read all labels to be sure you don t take too much.     For children, check the Tylenol bottle for the right dose. The dose is based on the child s age or weight.    3. If you have other health problems (like cancer, heart failure, an organ transplant or severe kidney disease): Call your specialty clinic if you don t feel better in the next 2 days.    4. Know when to call 911: Emergency warning signs include:    Trouble breathing or shortness of breath    Pain or pressure in the chest that doesn t go away    Feeling confused like you haven t felt before, or not being able to wake up    Bluish-colored lips or face    What are the symptoms of COVID-19?     The most common symptoms are cough, fever and trouble breathing.     Less common symptoms include body aches, chills, diarrhea (loose, watery poops), fatigue (feeling very tired), headache, runny nose, sore throat and loss of smell.    COVID-19 can cause severe coughing (bronchitis) and lung infection (pneumonia).    How does it spread?     The virus may spread when a person coughs or sneezes into the air. The virus can travel about 6 feet this way, and it can live on surfaces.      Common  (household disinfectants) will kill the virus.    Who is at risk?  Anyone can catch COVID-19 if they re around someone who has the virus.    How can others protect themselves?     Stay away from people who have COVID-19 (or symptoms of COVID-19).    Wash hands often with soap and water. Or, use hand  with at least 60% alcohol.    Avoid touching the eyes, nose or mouth.     Wear a face mask when you go out in public, when sick or when caring for a sick person.    Where can I get more information?     [facility]: About COVID-19: [email or web address]    CDC: What to Do If You re Sick:  www.cdc.gov/coronavirus/2019-ncov/about/steps-when-sick.html    CDC: Ending Home Isolation: www.cdc.gov/coronavirus/2019-ncov/hcp/disposition-in-home-patients.html     CDC: Caring for Someone: www.cdc.gov/coronavirus/2019-ncov/if-you-are-sick/care-for-someone.html     [facility]: Interim Guidance for Hospital Discharge to Home: www.[facility]/diseases/coronavirus/hcp/hospdischarge.pdf    [facility] clinical trials (COVID-19 research studies): clinicalaffairs.[facility]-clinical-trials     Below are the COVID-19 hotlines at the [place] Department of Health ([facility]). Interpreters are available.   o For health questions: Call 091-198-9134 or 1-202.711.2009 (7 a.m. to 7 p.m.)  o For questions about schools and childcare: Call 234-702-0817 or 1-220.977.1422 (7 a.m. to 7 p.m.)          Thank you for taking steps to prevent the spread of this virus.  o Limit your contact with others.  o Wear a simple mask to cover your cough.  o Wash your hands well and often.    [facility]: About COVID-19: [email or web address]    CDC: What to Do If You're Sick: www.cdc.gov/coronavirus/2019-ncov/about/steps-when-sick.html    CDC: Ending Home Isolation: www.cdc.gov/coronavirus/2019-ncov/hcp/disposition-in-home-patients.html     CDC: Caring for Someone: www.cdc.gov/coronavirus/2019-ncov/if-you-are-sick/care-for-someone.html     [facility]: Interim Guidance for Hospital Discharge to Home: www.[facility]/diseases/coronavirus/hcp/hospdischarge.pdf    [facility] clinical trials (COVID-19 research studies): clinicalaffairs.[facility]-clinical-trials     Below are the COVID-19 hotlines at the [place] Department of [facility] ([facility]). Interpreters are available.   o For health questions: Call 138-993-6127 or 1-863.153.5250 (7 a.m. to 7 p.m.)  o For questions about schools and childcare: Call 296-918-2560 or 1-391.594.3045 (7 a.m. to 7 p.m.)

## 2022-08-29 ENCOUNTER — VIRTUAL VISIT (OUTPATIENT)
Dept: FAMILY MEDICINE | Facility: CLINIC | Age: 27
End: 2022-08-29
Payer: COMMERCIAL

## 2022-08-29 ENCOUNTER — TELEPHONE (OUTPATIENT)
Dept: FAMILY MEDICINE | Facility: CLINIC | Age: 27
End: 2022-08-29

## 2022-08-29 DIAGNOSIS — Z63.8 CAREGIVER ROLE STRAIN: ICD-10-CM

## 2022-08-29 DIAGNOSIS — R41.840 INATTENTION: ICD-10-CM

## 2022-08-29 DIAGNOSIS — F33.1 MODERATE EPISODE OF RECURRENT MAJOR DEPRESSIVE DISORDER (H): ICD-10-CM

## 2022-08-29 DIAGNOSIS — F41.9 ANXIETY: Primary | ICD-10-CM

## 2022-08-29 PROCEDURE — 96127 BRIEF EMOTIONAL/BEHAV ASSMT: CPT | Mod: 95 | Performed by: NURSE PRACTITIONER

## 2022-08-29 PROCEDURE — 99204 OFFICE O/P NEW MOD 45 MIN: CPT | Mod: 95 | Performed by: NURSE PRACTITIONER

## 2022-08-29 RX ORDER — SERTRALINE HYDROCHLORIDE 25 MG/1
25 TABLET, FILM COATED ORAL DAILY
Qty: 30 TABLET | Refills: 0 | Status: SHIPPED | OUTPATIENT
Start: 2022-08-29 | End: 2022-10-04

## 2022-08-29 SDOH — SOCIAL STABILITY - SOCIAL INSECURITY: OTHER SPECIFIED PROBLEMS RELATED TO PRIMARY SUPPORT GROUP: Z63.8

## 2022-08-29 ASSESSMENT — ANXIETY QUESTIONNAIRES
4. TROUBLE RELAXING: NEARLY EVERY DAY
GAD7 TOTAL SCORE: 20
6. BECOMING EASILY ANNOYED OR IRRITABLE: NEARLY EVERY DAY
2. NOT BEING ABLE TO STOP OR CONTROL WORRYING: NEARLY EVERY DAY
7. FEELING AFRAID AS IF SOMETHING AWFUL MIGHT HAPPEN: NEARLY EVERY DAY
8. IF YOU CHECKED OFF ANY PROBLEMS, HOW DIFFICULT HAVE THESE MADE IT FOR YOU TO DO YOUR WORK, TAKE CARE OF THINGS AT HOME, OR GET ALONG WITH OTHER PEOPLE?: EXTREMELY DIFFICULT
1. FEELING NERVOUS, ANXIOUS, OR ON EDGE: NEARLY EVERY DAY
3. WORRYING TOO MUCH ABOUT DIFFERENT THINGS: NEARLY EVERY DAY
IF YOU CHECKED OFF ANY PROBLEMS ON THIS QUESTIONNAIRE, HOW DIFFICULT HAVE THESE PROBLEMS MADE IT FOR YOU TO DO YOUR WORK, TAKE CARE OF THINGS AT HOME, OR GET ALONG WITH OTHER PEOPLE: EXTREMELY DIFFICULT
5. BEING SO RESTLESS THAT IT IS HARD TO SIT STILL: MORE THAN HALF THE DAYS
7. FEELING AFRAID AS IF SOMETHING AWFUL MIGHT HAPPEN: NEARLY EVERY DAY
GAD7 TOTAL SCORE: 20
GAD7 TOTAL SCORE: 20

## 2022-08-29 ASSESSMENT — PATIENT HEALTH QUESTIONNAIRE - PHQ9
SUM OF ALL RESPONSES TO PHQ QUESTIONS 1-9: 23
SUM OF ALL RESPONSES TO PHQ QUESTIONS 1-9: 23
10. IF YOU CHECKED OFF ANY PROBLEMS, HOW DIFFICULT HAVE THESE PROBLEMS MADE IT FOR YOU TO DO YOUR WORK, TAKE CARE OF THINGS AT HOME, OR GET ALONG WITH OTHER PEOPLE: EXTREMELY DIFFICULT

## 2022-08-29 NOTE — PATIENT INSTRUCTIONS
[place] suicide prevention number:  616-906-LPGU (8255)    National [place] of Mental Health: 025-642-1195      National Suicide Prevention Lifeline.  The 988 code is more than just an easy-to-remember number - it s a direct connection to compassionate, accessible care and support for anyone experiencing mental health-related distress. People can also dial 988 if they are worried about a loved one who may need crisis support. 988 is just the first step in a larger initiative to build a robust crisis response system across the country.    How to access the Lifeline:    Call 2-540-546-9950  Call 988 - services available in English and [place], interpretation services in over 150 languages  Text 988 (English only)  Chat using the Lifeline website (English only)       Key Details:    The 988 dialing code will operate through the existing Lifeline number (6-809-965-7050); the 10-digit number will not go away.  988 is confidential, free, and available 24/7/365  988 is accessible through every land line, cell phone, and voice-over internet device in the [place]  Materials in Epic referencing the 10-digit Lifeline will be updated on July 18 to include 988 information
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
## 2022-08-29 NOTE — TELEPHONE ENCOUNTER
Attempted to contact patient prior to virtual appointment today.    No answer. LVM @ 1:43    Isamar Shah RN  08/29/22

## 2022-08-30 ENCOUNTER — PATIENT OUTREACH (OUTPATIENT)
Dept: CARE COORDINATION | Facility: CLINIC | Age: 27
End: 2022-08-30

## 2022-08-30 NOTE — PROGRESS NOTES
Encounter opened in error- disregard    JULIENNE BarnesN, RN   [facility]  - Ambulatory Care Management

## 2022-08-30 NOTE — LETTER
[facility] CARE COORDINATION      August 31, 2022    Liliam Cole  [address]   [place] [address]      Dear Lilaim,        I am a clinic community health worker who works with [facility] with the [facility]. I have been trying to reach you recently to introduce Clinic Care Coordination. Below is a description of clinic care coordination and how I can further assist you.       The clinic care coordination team is made up of a registered nurse, , financial resource worker and community health worker who understand the health care system. The goal of clinic care coordination is to help you manage your health and improve access to the health care system. Our team works alongside your provider to assist you in determining your health and social needs. We can help you obtain health care and community resources, providing you with necessary information and education. We can work with you through any barriers and develop a care plan that helps coordinate and strengthen the communication between you and your care team.    Please feel free to contact me with any questions or concerns regarding care coordination and what we can offer.      We are focused on providing you with the highest-quality healthcare experience possible.    Sincerely,     REBEKAH Low  [place]re[place], [place], [place] and [facility]  [phone]

## 2022-08-30 NOTE — PROGRESS NOTES
[facility]/Voicemail       Clinical Data: Care Coordinator Outreach  Outreach attempted x 1.  Left message on patient's voicemail with call back information and requested return call.  Plan:  Care Coordinator will try to reach patient again in 1-2 business days.

## 2022-08-31 NOTE — PROGRESS NOTES
[facility]/Voicemail       Clinical Data: Care Coordinator Outreach  Outreach attempted x 2.  Left message on patient's voicemail with call back information and requested return call.  Plan: Care Coordinator will send care coordination introduction letter with care coordinator contact information and explanation of care coordination services via Labs on the Got. Care Coordinator will do no further outreaches at this time.

## 2022-10-04 DIAGNOSIS — F33.1 MODERATE EPISODE OF RECURRENT MAJOR DEPRESSIVE DISORDER (H): ICD-10-CM

## 2022-10-04 DIAGNOSIS — F41.9 ANXIETY: ICD-10-CM

## 2022-10-04 RX ORDER — SERTRALINE HYDROCHLORIDE 25 MG/1
25 TABLET, FILM COATED ORAL DAILY
Qty: 30 TABLET | Refills: 0 | Status: SHIPPED | OUTPATIENT
Start: 2022-10-04 | End: 2022-11-15

## 2022-11-14 ENCOUNTER — TELEPHONE (OUTPATIENT)
Dept: NURSING | Facility: CLINIC | Age: 27
End: 2022-11-14

## 2022-11-14 NOTE — TELEPHONE ENCOUNTER
"Wants to increase Sertraline 25 mg dose. Patient says she was instructed at visit with YARELI Aguero to do this if she wanted to.  Did increase to 2 tabs about 2 weeks ago. Now only has 1 tab left. Is requesting new Rx be sent to [facility]. Patient informed that per visit note, she was to f/u in 4 weeks. Patient informed note would be sent to provider to inquire about new Rx being sent. Transferred Patient to schedule f/u appointment.     GEOVANNY BILLY RN  [facility] nurse advisors  11/14/2022  6:02 PM  Routed to provider.     \" Return in about 4 weeks (around 9/26/2022) for Medication check, Phone or Video visit okay, Anxiety Recheck.     Jyoti Aguero NP  [facility]\"   "

## 2022-11-15 ENCOUNTER — VIRTUAL VISIT (OUTPATIENT)
Dept: INTERNAL MEDICINE | Facility: CLINIC | Age: 27
End: 2022-11-15
Payer: COMMERCIAL

## 2022-11-15 DIAGNOSIS — F41.1 GENERALIZED ANXIETY DISORDER: Primary | ICD-10-CM

## 2022-11-15 DIAGNOSIS — F33.1 MODERATE EPISODE OF RECURRENT MAJOR DEPRESSIVE DISORDER (H): ICD-10-CM

## 2022-11-15 PROBLEM — M53.3 PAIN IN THE COCCYX: Status: RESOLVED | Noted: 2019-05-16 | Resolved: 2022-11-15

## 2022-11-15 PROCEDURE — 99203 OFFICE O/P NEW LOW 30 MIN: CPT | Mod: 95 | Performed by: INTERNAL MEDICINE

## 2022-11-15 ASSESSMENT — PATIENT HEALTH QUESTIONNAIRE - PHQ9
SUM OF ALL RESPONSES TO PHQ QUESTIONS 1-9: 16
SUM OF ALL RESPONSES TO PHQ QUESTIONS 1-9: 16

## 2022-11-15 NOTE — PROGRESS NOTES
"Liliam is a 27 year old female contacting the clinic today via video, who will use the platform: Anonymous You for the visit.  Phone # for Doximity, or if Amwell drops:   Telephone Information:   Mobile [phone]          ASSESSMENT and PLAN:  {[id number]::\"***\",\" \",\" \"}    CHIEF COMPLAINT:  Chief Complaint   Patient presents with     Office Visit     Wants to increase dosage of Setraline. has run out due to increasing dosage herself. It is working for her.      Recheck Medication       HPI    HISTORY OF PRESENT ILLNESS:  Liliam is a 27 year old female contacting the clinic today via video for ***    REVIEW OF SYSTEMS:   ***      PFSH:  Social History     Social History Narrative     Not on file     ***    TOBACCO USE:  History   Smoking Status     Never   Smokeless Tobacco     Never       VITALS:  There were no vitals filed for this visit.  There were no vitals taken for this visit. Estimated body mass index is 24.05 kg/m  as calculated from the following:    Height as of 4/15/21: 1.676 m (5' 6\").    Weight as of 4/15/21: 67.6 kg (149 lb).    PHYSICAL EXAM:  (observations via Video)  ***    MEDICATIONS:   Current Outpatient Medications   Medication Sig Dispense Refill     sertraline (ZOLOFT) 25 MG tablet Take 1 tablet (25 mg) by mouth daily *Due for visit for refills* 30 tablet 0       Outside Notes summarized: ***  Labs, x-rays, cardiology, GI tests reviewed: ***  Recent Labs   Lab Test 04/17/21  0902   HGB 8.0*     Lab Results   Component Value Date    [id number] Negative 04/16/2021     No results found for: CHOL  New orders: No orders of the defined types were placed in this encounter.      Independent review of:    Supplemental history by:  ***      Julissa Brice  [facility]    Video-Visit Details  Type of service:  Video Visit  Patient has given verbal consent to a Video visit?  Yes  How would you like to obtain your AVS?  MyChart  Will anyone else be joining your video visit? No  " ***  Originating Location (pt. Location): Home    Distant Location (provider location):  {virtual location provider:[id number]}    Video Start Time: {Time now:152948}  Video End time:  {Time now:152948}  Face to face plus orders:  *** minutes  Documentation time:  3 minutes     The visit lasted a total of *** minutes

## 2022-11-15 NOTE — PATIENT INSTRUCTIONS
Okay to increase sertraline to 50 mg daily    May increase to 75 or 100 mg daily    Prescription adjusted with increased quantity and refills    Chiarat communication initiated    Follow-up as needed

## 2022-11-15 NOTE — PROGRESS NOTES
Liliam is a 27 year old female contacting the clinic today via video, who will use the platform: MedArkive for the visit.  Phone # for Doximity, or if Amwell drops:   Telephone Information:   Mobile [phone]          ASSESSMENT and PLAN:  1. Generalized anxiety disorder  Much better on sertraline 50.  Okay to adjust dose between 50 and 100 and prescription written accordingly  - sertraline (ZOLOFT) 50 MG tablet; Take 1-2 tablets ( mg) by mouth daily  Dispense: 180 tablet; Refill: 3    2. Moderate episode of recurrent major depressive disorder (H)  As above.  Explained pharmacology  - sertraline (ZOLOFT) 50 MG tablet; Take 1-2 tablets ( mg) by mouth daily  Dispense: 180 tablet; Refill: 3       Patient Instructions   Okay to increase sertraline to 50 mg daily    May increase to 75 or 100 mg daily    Prescription adjusted with increased quantity and refills    MyChart communication initiated    Follow-up as needed            Return in about 1 year (around 11/15/2023) for using a video visit.       CHIEF COMPLAINT:  Chief Complaint   Patient presents with     Office Visit     Wants to increase dosage of Setraline. has run out due to increasing dosage herself. It is working for her.      Recheck Medication       HPI    HISTORY OF PRESENT ILLNESS:  Liliam is a 27 year old female contacting the clinic today via video for request for sertraline refill.  She was prescribed sertraline 25 mg daily through a virtual visit on August 29.  This was dramatically helpful within a few days.  After finds that this dose she tried increasing to 50 mg.  This again caused improvement in both anxiety and depression however she reports is running out.  Refill protocol insisted that she be seen.  She is having no side effects.  She felt a few headaches the first few days of the dose increase but these have resolved.  Anxiety and depression much better    REVIEW OF SYSTEMS:   Otherwise healthy    PFSH:  Social History     Social  "History Narrative     Not on file     Lives with her mother.  3 children    TOBACCO USE:  History   Smoking Status     Never   Smokeless Tobacco     Never       VITALS:  There were no vitals filed for this visit.  There were no vitals taken for this visit. Estimated body mass index is 24.05 kg/m  as calculated from the following:    Height as of 4/15/21: 1.676 m (5' 6\").    Weight as of 4/15/21: 67.6 kg (149 lb).    PHYSICAL EXAM:  (observations via Video)  Alert and oriented.  Friendly.  Tattoos on neck and arm    MEDICATIONS:   Current Outpatient Medications   Medication Sig Dispense Refill     sertraline (ZOLOFT) 50 MG tablet Take 1-2 tablets ( mg) by mouth daily 180 tablet 3       Outside Notes summarized: Colleague note August 29 initiating sertraline  Labs, x-rays, cardiology, GI tests reviewed: Anemia a year ago  Recent Labs   Lab Test 04/17/21  0902   HGB 8.0*     Lab Results   Component Value Date    [id number] Negative 04/16/2021     No results found for: CHOL  New orders: No orders of the defined types were placed in this encounter.      Independent review of:    Supplemental history by:        Marlon Padron MD  [facility]    Video-Visit Details  Type of service:  Video Visit  Patient has given verbal consent to a Video visit?  Yes  How would you like to obtain your AVS?  MyChart  Will anyone else be joining your video visit? No    Originating Location (pt. Location): Home    Distant Location (provider location):  Off-site    Video Start Time: 2:38 PM  Video End time:  2:59 PM  Face to face plus orders: 21 minutes  Documentation time:  3 minutes     The visit lasted a total of 22 minutes       Answers for HPI/ROS submitted by the patient on 11/15/2022  PHQ9 TOTAL SCORE: 16      "

## 2022-11-16 NOTE — TELEPHONE ENCOUNTER
Looking at pts chart it appears that pt has had a visit with Dr. Padron on 11/15/22 and this was addressed at that time. See Plan below. No further refills are needed at this time. Will close encounter.  Sandra Renteria CMA      ASSESSMENT and PLAN:  1. Generalized anxiety disorder  Much better on sertraline 50.  Okay to adjust dose between 50 and 100 and prescription written accordingly  - sertraline (ZOLOFT) 50 MG tablet; Take 1-2 tablets ( mg) by mouth daily  Dispense: 180 tablet; Refill: 3     2. Moderate episode of recurrent major depressive disorder (H)  As above.  Explained pharmacology  - sertraline (ZOLOFT) 50 MG tablet; Take 1-2 tablets ( mg) by mouth daily  Dispense: 180 tablet; Refill: 3        Patient Instructions   Okay to increase sertraline to 50 mg daily    May increase to 75 or 100 mg daily    Prescription adjusted with increased quantity and refills    Fabiohart communication initiated    Follow-up as needed            Return in about 1 year (around 11/15/2023) for using a video visit.

## 2024-04-08 DIAGNOSIS — F41.1 GENERALIZED ANXIETY DISORDER: ICD-10-CM

## 2024-04-08 DIAGNOSIS — F33.1 MODERATE EPISODE OF RECURRENT MAJOR DEPRESSIVE DISORDER (H): ICD-10-CM

## 2024-06-18 ASSESSMENT — ANXIETY QUESTIONNAIRES
GAD7 TOTAL SCORE: 21
7. FEELING AFRAID AS IF SOMETHING AWFUL MIGHT HAPPEN: NEARLY EVERY DAY
2. NOT BEING ABLE TO STOP OR CONTROL WORRYING: NEARLY EVERY DAY
3. WORRYING TOO MUCH ABOUT DIFFERENT THINGS: NEARLY EVERY DAY
7. FEELING AFRAID AS IF SOMETHING AWFUL MIGHT HAPPEN: NEARLY EVERY DAY
8. IF YOU CHECKED OFF ANY PROBLEMS, HOW DIFFICULT HAVE THESE MADE IT FOR YOU TO DO YOUR WORK, TAKE CARE OF THINGS AT HOME, OR GET ALONG WITH OTHER PEOPLE?: EXTREMELY DIFFICULT
5. BEING SO RESTLESS THAT IT IS HARD TO SIT STILL: NEARLY EVERY DAY
4. TROUBLE RELAXING: NEARLY EVERY DAY
IF YOU CHECKED OFF ANY PROBLEMS ON THIS QUESTIONNAIRE, HOW DIFFICULT HAVE THESE PROBLEMS MADE IT FOR YOU TO DO YOUR WORK, TAKE CARE OF THINGS AT HOME, OR GET ALONG WITH OTHER PEOPLE: EXTREMELY DIFFICULT
1. FEELING NERVOUS, ANXIOUS, OR ON EDGE: NEARLY EVERY DAY
GAD7 TOTAL SCORE: 21
6. BECOMING EASILY ANNOYED OR IRRITABLE: NEARLY EVERY DAY

## 2024-06-18 ASSESSMENT — PATIENT HEALTH QUESTIONNAIRE - PHQ9
SUM OF ALL RESPONSES TO PHQ QUESTIONS 1-9: 25
10. IF YOU CHECKED OFF ANY PROBLEMS, HOW DIFFICULT HAVE THESE PROBLEMS MADE IT FOR YOU TO DO YOUR WORK, TAKE CARE OF THINGS AT HOME, OR GET ALONG WITH OTHER PEOPLE: EXTREMELY DIFFICULT
SUM OF ALL RESPONSES TO PHQ QUESTIONS 1-9: 25

## 2024-06-19 ENCOUNTER — VIRTUAL VISIT (OUTPATIENT)
Dept: INTERNAL MEDICINE | Facility: CLINIC | Age: 29
End: 2024-06-19
Payer: COMMERCIAL

## 2024-06-19 ENCOUNTER — TELEPHONE (OUTPATIENT)
Dept: INTERNAL MEDICINE | Facility: CLINIC | Age: 29
End: 2024-06-19

## 2024-06-19 DIAGNOSIS — F33.1 MODERATE EPISODE OF RECURRENT MAJOR DEPRESSIVE DISORDER (H): ICD-10-CM

## 2024-06-19 DIAGNOSIS — D64.9 ANEMIA, UNSPECIFIED TYPE: ICD-10-CM

## 2024-06-19 DIAGNOSIS — F41.1 GENERALIZED ANXIETY DISORDER: Primary | ICD-10-CM

## 2024-06-19 DIAGNOSIS — R53.83 FATIGUE, UNSPECIFIED TYPE: ICD-10-CM

## 2024-06-19 PROCEDURE — G2211 COMPLEX E/M VISIT ADD ON: HCPCS | Mod: 95 | Performed by: INTERNAL MEDICINE

## 2024-06-19 PROCEDURE — 99214 OFFICE O/P EST MOD 30 MIN: CPT | Mod: 95 | Performed by: INTERNAL MEDICINE

## 2024-06-19 RX ORDER — BUPROPION HYDROCHLORIDE 75 MG/1
75 TABLET ORAL 2 TIMES DAILY
Qty: 180 TABLET | Refills: 3 | Status: SHIPPED | OUTPATIENT
Start: 2024-06-19 | End: 2025-06-19

## 2024-06-19 NOTE — PROGRESS NOTES
OFFICE VISIT--Video    Liliam is a 29 year old female contacting the clinic today via video, who will use the platform: Mythos for the visit.  Phone # for Doximity, or if Amwell drops:   Telephone Information:   Mobile [phone]          ASSESSMENT and PLAN:  1. Generalized anxiety disorder  Continue and refill sertraline.  Add bupropion.  Referral for ADHD testing  - sertraline (ZOLOFT) 50 MG tablet; Take 1-2 tablets ( mg) by mouth daily  Dispense: 180 tablet; Refill: 3  - buPROPion (WELLBUTRIN) 75 MG tablet; Take 1 tablet (75 mg) by mouth 2 times daily  Dispense: 180 tablet; Refill: 3  - Adult Mental Health  Referral; Future    2. Moderate episode of recurrent major depressive disorder (H)  Update labs and add bupropion  - sertraline (ZOLOFT) 50 MG tablet; Take 1-2 tablets ( mg) by mouth daily  Dispense: 180 tablet; Refill: 3  - buPROPion (WELLBUTRIN) 75 MG tablet; Take 1 tablet (75 mg) by mouth 2 times daily  Dispense: 180 tablet; Refill: 3  - CBC with Platelets & Differential; Future  - Comprehensive metabolic panel; Future  - Ferritin; Future  - Vitamin D Deficiency; Future  - Vitamin B12; Future  - TSH; Future  - Adult Mental Health  Referral; Future    3. Anemia, unspecified type  Recheck with history of anemia  - CBC with Platelets & Differential; Future  - Ferritin; Future  - Vitamin B12; Future    4. Fatigue, unspecified type  As above       Patient Instructions   Continue sertraline 50 to 100 mg daily    Add bupropion 75 mg twice daily and adjust as needed    Update labs to rule out thyroid, anemia etc.    Referral to mental health for ADHD testing    Consider medical cannabis            Return in about 6 months (around 12/19/2024) for using a video visit.       CHIEF COMPLAINT:  Chief Complaint   Patient presents with    Mental Health Problem     Stopped her sertraline and then restarted it yesterday        HISTORY OF PRESENT ILLNESS:  Liliam is a 29 year old female  contacting the clinic today via video for review of medicines.  When we spoke 18 months ago she was stabilized on sertraline.  This has helped anxiety but when she takes higher doses it does not control depression.  She has tried doses up to 200 mg daily.  Complains of anxiety, depression, and possible ADHD.  Wonders whether she should be tested for ADHD.  Lost her female partner to lupus last year and her mother is also out of her life now.  Does see a psychotherapist.  No side effects on sertraline.  Does not smoke cigarettes but does smoke cannabis    History of Present Illness       Mental Health Follow-up:  Patient presents to follow-up on Depression & Anxiety.Patient's depression since last visit has been:  Worse  The patient is having other symptoms associated with depression.  Patient's anxiety since last visit has been:  Worse  The patient is having other symptoms associated with anxiety.  Any significant life events: No and grief or loss  Patient is not feeling anxious or having panic attacks.  Patient has no concerns about alcohol or drug use.    Reason for visit:  Need another med to take that isnt sertraline and discuss my therapy assessment that stated i should get an adhd referral and see. I notice more about my mental and emotional health and how it shows in my body. Sertraline isnt enough    She eats 2-3 servings of fruits and vegetables daily.She consumes 2 sweetened beverage(s) daily.She exercises with enough effort to increase her heart rate 10 to 19 minutes per day.  She exercises with enough effort to increase her heart rate 4 days per week. She is missing 2 dose(s) of medications per week.  She is not taking prescribed medications regularly due to side effects, remembering to take and other.      REVIEW OF SYSTEMS:   Erratic sleep    Today's PHQ-2 Score:       8/29/2022     2:05 PM   PHQ-2 ( 1999 Pf[employer])   PHQ-2 Score Incomplete       PFSH:  Social History     Social History Narrative    Not  "on file       TOBACCO USE:  History   Smoking Status    Never   Smokeless Tobacco    Never       VITALS:  There were no vitals filed for this visit.  LMP  (LMP Unknown)  Estimated body mass index is 24.05 kg/m  as calculated from the following:    Height as of 4/15/21: 1.676 m (5' 6\").    Weight as of 4/15/21: 67.6 kg (149 lb).    PHYSICAL EXAM:  (observations via Video)  Sitting in car.  Multiple tattoos    MEDICATIONS:   Current Outpatient Medications   Medication Sig Dispense Refill    buPROPion (WELLBUTRIN) 75 MG tablet Take 1 tablet (75 mg) by mouth 2 times daily 180 tablet 3    sertraline (ZOLOFT) 50 MG tablet Take 1-2 tablets ( mg) by mouth daily 180 tablet 3       Outside Notes summarized:   Labs, x-rays, cardiology, GI tests reviewed:   No results for input(s): \"HGB\", \"WBC\", \"NA\", \"POTASSIUM\", \"CR\", \"A1C\", \"PSA\", \"URIC\", \"B12\", \"TSH\", \"VITDT\", \"SED\", \"CRPI\" in the last 89088 hours.  Lab Results   Component Value Date    [id number] Negative 04/16/2021     No results found for: \"CHOL\"  New orders:   Orders Placed This Encounter   Procedures    Comprehensive metabolic panel    Ferritin    Vitamin D Deficiency    Vitamin B12    TSH    Adult Mental Health  Referral    CBC with Platelets & Differential       Independent review of:   Patient would like to receive their AVS by Serveron    Video-Visit Details  Type of service:  Video Visit      6/19/2024     9:32 AM   Additional Questions   Roomed by Miriam HEARD CMA     Patient has given verbal consent to a Video visit?  Yes  How would you like to obtain your AVS?  Serveron  Will anyone else be joining your video visit, giving supplemental history? No    Originating location (pt location): In Car    Distant Location (provider location):  Off-site    Video Start Time: 10:46 AM  Video End time:  11:13 AM  Face to face plus orders: 27 minutes  Documentation time:  3 minutes     The visit lasted a total of 30 minutes    Marlon Padron MD  Internal " [facility]

## 2024-06-19 NOTE — TELEPHONE ENCOUNTER
----- Message from Marlon Padron sent at 6/19/2024 11:10 AM CDT -----  Please schedule lab appointment

## 2024-06-19 NOTE — PROGRESS NOTES
Casey is a 29 year old who is being evaluated via a billable video visit.    {ROOMING STAFF complete during rooming of virtual visit (Optional):193742}  {If patient encounters technical issues they should call [phone] :847966}    {PROVIDER CHARTING PREFERENCE:326746}    Subjective   Casey is a 29 year old, presenting for the following health issues:  Mental Health Problem      6/19/2024     9:32 AM   Additional Questions   Roomed by Miriam HEARD CMA     Mental Health Problem    History of Present Illness       Mental Health Follow-up:  Patient presents to follow-up on Depression & Anxiety.Patient's depression since last visit has been:  Worse  The patient is having other symptoms associated with depression.  Patient's anxiety since last visit has been:  Worse  The patient is having other symptoms associated with anxiety.  Any significant life events: No and grief or loss  Patient is not feeling anxious or having panic attacks.  Patient has no concerns about alcohol or drug use.    Reason for visit:  Need another med to take that isnt sertraline and discuss my therapy assessment that stated i should get an adhd referral and see. I notice more about my mental and emotional health and how it shows in my body. Sertraline isnt enough    She eats 2-3 servings of fruits and vegetables daily.She consumes 2 sweetened beverage(s) daily.She exercises with enough effort to increase her heart rate 10 to 19 minutes per day.  She exercises with enough effort to increase her heart rate 4 days per week. She is missing 2 dose(s) of medications per week.  She is not taking prescribed medications regularly due to side effects, remembering to take and other.     {ALERT  Recent PHQ-9/EPDS score indicates suicidal ideations, document follow-up within the A/P section of the note :658660}{Provider Documentation  Link to C-SSRS (Brief [place]) Flowsheet :065324}  {SUPERLIST (Optional):918264}  {additonal problems for provider to add  "(Optional):703431}    {ROS Picklists (Optional):245045}      Objective           Vitals:  No vitals were obtained today due to virtual visit.    Physical Exam   {video visit exam brief selected:980748}    {Diagnostic Test Results (Optional):608582}      Video-Visit Details    Type of service:  Video Visit   Originating Location (pt. Location): {video visit patient location:992951::\"Home\"}  {PROVIDER LOCATION On-site should be selected for visits conducted from your clinic location or adjoining [facility] hospital, academic office, or other nearby [facility] building. Off-site should be selected for all other provider locations, including home:248388}  Distant Location (provider location):  {virtual location provider:041972}  Platform used for Video Visit: {Virtual Visit Platforms:372799::\"[employer]\"}  Signed Electronically by: Marlon Padron MD  {Email feedback regarding this note to [email or web address]   :097405}  "

## 2024-06-19 NOTE — LETTER
My Depression Action Plan  Name: Liliam Cole   Date of Birth 1995  Date: 8/20/2019    My doctor: Nicole Lynn   My clinic: [facility]  [address]  [facility] [address]  [phone]          GREEN    ZONE   Good Control    What it looks like:     Things are going generally well. You have normal up s and down s. You may even feel depressed from time to time, but bad moods usually last less than a day.   What you need to do:  1. Continue to care for yourself (see self care plan)  2. Check your depression survival kit and update it as needed  3. Follow your physician s recommendations including any medication.  4. Do not stop taking medication unless you consult with your physician first.           YELLOW         ZONE Getting Worse    What it looks like:     Depression is starting to interfere with your life.     It may be hard to get out of bed; you may be starting to isolate yourself from others.    Symptoms of depression are starting to last most all day and this has happened for several days.     You may have suicidal thoughts but they are not constant.   What you need to do:     1. Call your care team, your response to treatment will improve if you keep your care team informed of your progress. Yellow periods are signs an adjustment may need to be made.     2. Continue your self-care, even if you have to fake it!    3. Talk to someone in your support network    4. Open up your depression survival kit           RED    ZONE Medical Alert - Get Help    What it looks like:     Depression is seriously interfering with your life.     You may experience these or other symptoms: You can t get out of bed most days, can t work or engage in other necessary activities, you have trouble taking care of basic hygiene, or basic responsibilities, thoughts of suicide or death that will not go away, self-injurious behavior.     What you need to do:  1. Call your  Patient declines AD info   care team and request a same-day appointment. If they are not available (weekends or after hours) call your local crisis line, emergency room or 911.            Depression Self Care Plan / Survival Kit    Self-Care for Depression  Here s the deal. Your body and mind are really not as separate as most people think.  What you do and think affects how you feel and how you feel influences what you do and think. This means if you do things that people who feel good do, it will help you feel better.  Sometimes this is all it takes.  There is also a place for medication and therapy depending on how severe your depression is, so be sure to consult with your medical provider and/ or Behavioral Health Consultant if your symptoms are worsening or not improving.     In order to better manage my stress, I will:    Exercise  Get some form of exercise, every day. This will help reduce pain and release endorphins, the  feel good  chemicals in your brain. This is almost as good as taking antidepressants!  This is not the same as joining a gym and then never going! (they count on that by the way ) It can be as simple as just going for a walk or doing some gardening, anything that will get you moving.      Hygiene   Maintain good hygiene (Get out of bed in the morning, Make your bed, Brush your teeth, Take a shower, and Get dressed like you were going to work, even if you are unemployed).  If your clothes don't fit try to get ones that do.    Diet  I will strive to eat foods that are good for me, drink plenty of water, and avoid excessive sugar, caffeine, alcohol, and other mood-altering substances.  Some foods that are helpful in depression are: complex carbohydrates, B vitamins, flaxseed, fish or fish oil, fresh fruits and vegetables.    Psychotherapy  I agree to participate in Individual Therapy (if recommended).    Medication  If prescribed medications, I agree to take them.  Missing doses can result in serious side effects.  I  understand that drinking alcohol, or other illicit drug use, may cause potential side effects.  I will not stop my medication abruptly without first discussing it with my provider.    Staying Connected With Others  I will stay in touch with my friends, family members, and my primary care provider/team.    Use your imagination  Be creative.  We all have a creative side; it doesn t matter if it s oil painting, sand castles, or mud pies! This will also kick up the endorphins.    Witness Beauty  (AKA stop and smell the roses) Take a look outside, even in mid-winter. Notice colors, textures. Watch the squirrels and birds.     Service to others  Be of service to others.  There is always someone else in need.  By helping others we can  get out of ourselves  and remember the really important things.  This also provides opportunities for practicing all the other parts of the program.    Humor  Laugh and be silly!  Adjust your TV habits for less news and crime-drama and more comedy.    Control your stress  Try breathing deep, massage therapy, biofeedback, and meditation. Find time to relax each day.     My support system    Clinic Contact:  Phone number:    Contact 1:  Phone number:    Contact 2:  Phone number:    Bahai/:  Phone number:    Therapist:  Phone number:    Local crisis center:    Phone number:    Other community support:  Phone number:

## 2024-06-19 NOTE — PATIENT INSTRUCTIONS
Continue sertraline 50 to 100 mg daily    Add bupropion 75 mg twice daily and adjust as needed    Update labs to rule out thyroid, anemia etc.    Referral to mental health for ADHD testing    Consider medical cannabis

## 2024-06-21 ENCOUNTER — LAB (OUTPATIENT)
Dept: LAB | Facility: CLINIC | Age: 29
End: 2024-06-21
Payer: COMMERCIAL

## 2024-06-21 DIAGNOSIS — D64.9 ANEMIA, UNSPECIFIED TYPE: ICD-10-CM

## 2024-06-21 DIAGNOSIS — F33.1 MODERATE EPISODE OF RECURRENT MAJOR DEPRESSIVE DISORDER (H): ICD-10-CM

## 2024-06-21 LAB
ALBUMIN SERPL BCG-MCNC: 4.6 G/DL (ref 3.5–5.2)
ALP SERPL-CCNC: 57 U/L (ref 40–150)
ALT SERPL W P-5'-P-CCNC: 11 U/L (ref 0–50)
ANION GAP SERPL CALCULATED.3IONS-SCNC: 9 MMOL/L (ref 7–15)
AST SERPL W P-5'-P-CCNC: 23 U/L (ref 0–45)
BASOPHILS # BLD AUTO: 0 10E3/UL (ref 0–0.2)
BASOPHILS NFR BLD AUTO: 0 %
BILIRUB SERPL-MCNC: 0.4 MG/DL
BUN SERPL-MCNC: 10.1 MG/DL (ref 6–20)
CALCIUM SERPL-MCNC: 9.3 MG/DL (ref 8.6–10)
CHLORIDE SERPL-SCNC: 103 MMOL/L (ref 98–107)
CREAT SERPL-MCNC: 0.89 MG/DL (ref 0.51–0.95)
DEPRECATED HCO3 PLAS-SCNC: 24 MMOL/L (ref 22–29)
EGFRCR SERPLBLD CKD-EPI 2021: 89 ML/MIN/1.73M2
EOSINOPHIL # BLD AUTO: 0.1 10E3/UL (ref 0–0.7)
EOSINOPHIL NFR BLD AUTO: 1 %
ERYTHROCYTE [DISTWIDTH] IN BLOOD BY AUTOMATED COUNT: 12.4 % (ref 10–15)
FERRITIN SERPL-MCNC: 21 NG/ML (ref 6–175)
GLUCOSE SERPL-MCNC: 90 MG/DL (ref 70–99)
HCT VFR BLD AUTO: 42.4 % (ref 35–47)
HGB BLD-MCNC: 14.2 G/DL (ref 11.7–15.7)
IMM GRANULOCYTES # BLD: 0 10E3/UL
IMM GRANULOCYTES NFR BLD: 0 %
LYMPHOCYTES # BLD AUTO: 2 10E3/UL (ref 0.8–5.3)
LYMPHOCYTES NFR BLD AUTO: 37 %
MCH RBC QN AUTO: 29.6 PG (ref 26.5–33)
MCHC RBC AUTO-ENTMCNC: 33.5 G/DL (ref 31.5–36.5)
MCV RBC AUTO: 88 FL (ref 78–100)
MONOCYTES # BLD AUTO: 0.3 10E3/UL (ref 0–1.3)
MONOCYTES NFR BLD AUTO: 6 %
NEUTROPHILS # BLD AUTO: 3 10E3/UL (ref 1.6–8.3)
NEUTROPHILS NFR BLD AUTO: 55 %
PLATELET # BLD AUTO: 273 10E3/UL (ref 150–450)
POTASSIUM SERPL-SCNC: 4.4 MMOL/L (ref 3.4–5.3)
PROT SERPL-MCNC: 8 G/DL (ref 6.4–8.3)
RBC # BLD AUTO: 4.8 10E6/UL (ref 3.8–5.2)
SODIUM SERPL-SCNC: 136 MMOL/L (ref 135–145)
TSH SERPL DL<=0.005 MIU/L-ACNC: 0.75 UIU/ML (ref 0.3–4.2)
VIT B12 SERPL-MCNC: 783 PG/ML (ref 232–1245)
VIT D+METAB SERPL-MCNC: 18 NG/ML (ref 20–50)
WBC # BLD AUTO: 5.4 10E3/UL (ref 4–11)

## 2024-06-21 PROCEDURE — 82306 VITAMIN D 25 HYDROXY: CPT

## 2024-06-21 PROCEDURE — 84443 ASSAY THYROID STIM HORMONE: CPT

## 2024-06-21 PROCEDURE — 36415 COLL VENOUS BLD VENIPUNCTURE: CPT

## 2024-06-21 PROCEDURE — 82728 ASSAY OF FERRITIN: CPT

## 2024-06-21 PROCEDURE — 80053 COMPREHEN METABOLIC PANEL: CPT

## 2024-06-21 PROCEDURE — 85025 COMPLETE CBC W/AUTO DIFF WBC: CPT

## 2024-06-21 PROCEDURE — 82607 VITAMIN B-12: CPT

## 2024-11-12 ENCOUNTER — TELEPHONE (OUTPATIENT)
Dept: INTERNAL MEDICINE | Facility: CLINIC | Age: 29
End: 2024-11-12

## 2024-11-12 NOTE — TELEPHONE ENCOUNTER
Please start prior authorization.    Call plan [phone] to initiate    Pt ID # is - [id number]     Disp Refills Start End KING   sertraline (ZOLOFT) 50 MG tablet 180 tablet 3 6/19/2024 -- No   Sig - Route: Take 1-2 tablets ( mg) by mouth daily - Oral   Sent to pharmacy as: Sertraline HCl 50 MG Oral Tablet (ZOLOFT)   Class: E-Prescribe   Order: [id number]   E-Prescribing Status: Receipt confirmed by pharmacy (6/19/2024 11:09 AM CDT)     Printout Tracking    External Result Report     Pharmacy    [facility] #[id number] [address]     Associated Diagnoses    Generalized anxiety disorder [F41.1]  - Primary      Moderate episode of recurrent major depressive disorder (H) [F33.1]        Source Order Set    Order Set Name Order ID    [id number]       Prescribing Provider's NPI: [id number]  Marlon Padron

## 2024-11-17 NOTE — TELEPHONE ENCOUNTER
PA Initiation    Medication: SERTRALINE HCL 50 MG PO TABS  Insurance Company: [employer] - Phone [phone] Fax [phone]  Pharmacy Filling the Rx:    Filling Pharmacy Phone:    Filling Pharmacy Fax:    Start Date: 11/17/2024

## 2024-11-18 NOTE — TELEPHONE ENCOUNTER
Prior Authorization Approval    Medication: SERTRALINE HCL 50 MG PO TABS  Authorization Effective Date: 10/19/2024  Authorization Expiration Date: 11/18/2025  Approved Dose/Quantity:   Reference #:     Insurance Company: [employer] - Phone [phone] Fax [phone]  Expected CoPay: $    CoPay Card Available:      Financial Assistance Needed:   Which [id number] [address]  Pharmacy Notified: YES  Patient Notified: Instructed pharmacy to notify patient once order is ready.

## 2025-01-15 SDOH — HEALTH STABILITY: PHYSICAL HEALTH: ON AVERAGE, HOW MANY DAYS PER WEEK DO YOU ENGAGE IN MODERATE TO STRENUOUS EXERCISE (LIKE A BRISK WALK)?: 2 DAYS

## 2025-01-15 SDOH — HEALTH STABILITY: PHYSICAL HEALTH: ON AVERAGE, HOW MANY MINUTES DO YOU ENGAGE IN EXERCISE AT THIS LEVEL?: 90 MIN

## 2025-01-15 ASSESSMENT — ANXIETY QUESTIONNAIRES
7. FEELING AFRAID AS IF SOMETHING AWFUL MIGHT HAPPEN: NEARLY EVERY DAY
3. WORRYING TOO MUCH ABOUT DIFFERENT THINGS: NEARLY EVERY DAY
6. BECOMING EASILY ANNOYED OR IRRITABLE: NEARLY EVERY DAY
7. FEELING AFRAID AS IF SOMETHING AWFUL MIGHT HAPPEN: NEARLY EVERY DAY
1. FEELING NERVOUS, ANXIOUS, OR ON EDGE: NEARLY EVERY DAY
GAD7 TOTAL SCORE: 20
8. IF YOU CHECKED OFF ANY PROBLEMS, HOW DIFFICULT HAVE THESE MADE IT FOR YOU TO DO YOUR WORK, TAKE CARE OF THINGS AT HOME, OR GET ALONG WITH OTHER PEOPLE?: EXTREMELY DIFFICULT
IF YOU CHECKED OFF ANY PROBLEMS ON THIS QUESTIONNAIRE, HOW DIFFICULT HAVE THESE PROBLEMS MADE IT FOR YOU TO DO YOUR WORK, TAKE CARE OF THINGS AT HOME, OR GET ALONG WITH OTHER PEOPLE: EXTREMELY DIFFICULT
5. BEING SO RESTLESS THAT IT IS HARD TO SIT STILL: NEARLY EVERY DAY
GAD7 TOTAL SCORE: 20
4. TROUBLE RELAXING: NEARLY EVERY DAY
2. NOT BEING ABLE TO STOP OR CONTROL WORRYING: MORE THAN HALF THE DAYS
GAD7 TOTAL SCORE: 20

## 2025-01-15 ASSESSMENT — PATIENT HEALTH QUESTIONNAIRE - PHQ9
SUM OF ALL RESPONSES TO PHQ QUESTIONS 1-9: 25
SUM OF ALL RESPONSES TO PHQ QUESTIONS 1-9: 25
10. IF YOU CHECKED OFF ANY PROBLEMS, HOW DIFFICULT HAVE THESE PROBLEMS MADE IT FOR YOU TO DO YOUR WORK, TAKE CARE OF THINGS AT HOME, OR GET ALONG WITH OTHER PEOPLE: EXTREMELY DIFFICULT

## 2025-01-15 ASSESSMENT — SOCIAL DETERMINANTS OF HEALTH (SDOH): HOW OFTEN DO YOU GET TOGETHER WITH FRIENDS OR RELATIVES?: NEVER

## 2025-01-16 ENCOUNTER — OFFICE VISIT (OUTPATIENT)
Dept: FAMILY MEDICINE | Facility: CLINIC | Age: 30
End: 2025-01-16
Payer: COMMERCIAL

## 2025-01-16 VITALS
WEIGHT: 115.9 LBS | HEIGHT: 66 IN | SYSTOLIC BLOOD PRESSURE: 102 MMHG | BODY MASS INDEX: 18.63 KG/M2 | RESPIRATION RATE: 18 BRPM | DIASTOLIC BLOOD PRESSURE: 70 MMHG | TEMPERATURE: 97.6 F | OXYGEN SATURATION: 98 % | HEART RATE: 110 BPM

## 2025-01-16 DIAGNOSIS — Z13.9 ENCOUNTER FOR SCREENING INVOLVING SOCIAL DETERMINANTS OF HEALTH (SDOH): ICD-10-CM

## 2025-01-16 DIAGNOSIS — F41.1 GENERALIZED ANXIETY DISORDER: ICD-10-CM

## 2025-01-16 DIAGNOSIS — Z00.00 ROUTINE HISTORY AND PHYSICAL EXAMINATION OF ADULT: Primary | ICD-10-CM

## 2025-01-16 DIAGNOSIS — F90.9 ATTENTION DEFICIT HYPERACTIVITY DISORDER (ADHD), UNSPECIFIED ADHD TYPE: ICD-10-CM

## 2025-01-16 DIAGNOSIS — F84.0 AUTISM: ICD-10-CM

## 2025-01-16 DIAGNOSIS — F33.1 MODERATE EPISODE OF RECURRENT MAJOR DEPRESSIVE DISORDER (H): ICD-10-CM

## 2025-01-16 LAB
ERYTHROCYTE [DISTWIDTH] IN BLOOD BY AUTOMATED COUNT: 11.8 % (ref 10–15)
HCT VFR BLD AUTO: 39.6 % (ref 35–47)
HGB BLD-MCNC: 13.5 G/DL (ref 11.7–15.7)
MCH RBC QN AUTO: 29.5 PG (ref 26.5–33)
MCHC RBC AUTO-ENTMCNC: 34.1 G/DL (ref 31.5–36.5)
MCV RBC AUTO: 87 FL (ref 78–100)
PLATELET # BLD AUTO: 258 10E3/UL (ref 150–450)
RBC # BLD AUTO: 4.57 10E6/UL (ref 3.8–5.2)
WBC # BLD AUTO: 8.7 10E3/UL (ref 4–11)

## 2025-01-16 ASSESSMENT — PAIN SCALES - GENERAL: PAINLEVEL_OUTOF10: NO PAIN (0)

## 2025-01-16 NOTE — PROGRESS NOTES
Preventive Care Visit  [facility]  CURTIS Myles CNP, Family Medicine  Jan 16, 2025      Assessment & Plan     Routine history and physical examination of adult  Health maintenance reviewed and addressed.  Screening labs today per maintenance, age, risk assessment, and to promote patient wellbeing.  Patient declined Pap exam today, last Pap completed in 2019.  Advised patient that she needs to return for Pap exam.  Declined immunizations: COVID, influenza, second dose of HPV.  Blood pressure in clinic 102/70.  - REVIEW OF HEALTH MAINTENANCE PROTOCOL ORDERS  - PRIMARY CARE FOLLOW-UP SCHEDULING; Future  - Comprehensive metabolic panel; Future  - CBC with platelets; Future  - Lipid panel reflex to direct LDL Fasting; Future    Encounter for screening involving social determinants of health (SDoH)    Generalized anxiety disorder  Moderate episode of recurrent major depressive disorder (H)  PHQ-9 score 25 and CARMELLA score 20.  Recently restarted taking sertraline 50 mg and bupropion 75 mg daily 2-3 days ago.  She will titrate up to sertraline 100 mg daily and bupropion 75 mg twice daily.  Previously had been off medication for approximately a month as she did not have refills.  Primary care provider had provided her refills.  This could be attributing to increased scores.  Patient does not believe medications are managing mental health well.  She also has concerns about undiagnosed ADHD and autism that could be increasing mental health.  Denies self-harm or SI.  Placed referral to psychiatry for medication management.  - Adult Mental Health  Referral; Future    Attention deficit hyperactivity disorder (ADHD), unspecified ADHD type  Has concerns for ADHD and would like neuropsych testing completed, referral placed.    - Adult Mental Health  Referral; Future    Autism  Has concerns for autism and would like neuropsych testing completed, referral placed.  - Adult Mental Health  Jhon Referral; Future    Depression Screening Follow Up        1/15/2025     8:49 PM   PHQ   PHQ-9 Total Score 25    Q9: Thoughts of better off dead/self-harm past 2 weeks Several days   F/U: Thoughts of suicide or self-harm No   F/U: Safety concerns Yes       Patient-reported         1/15/2025     8:49 PM   Last PHQ-9   1.  Little interest or pleasure in doing things 3   2.  Feeling down, depressed, or hopeless 3   3.  Trouble falling or staying asleep, or sleeping too much 3   4.  Feeling tired or having little energy 3   5.  Poor appetite or overeating 3   6.  Feeling bad about yourself 3   7.  Trouble concentrating 3   8.  Moving slowly or restless 3   Q9: Thoughts of better off dead/self-harm past 2 weeks 1   PHQ-9 Total Score 25    In the past two weeks have you had thoughts of suicide or self harm? No   Do you have concerns about your personal safety or the safety of others? Yes       Patient-reported         Follow Up Actions Taken  Mental Health Referral placed    Discussed the following ways the patient can remain in a safe environment:  be around others.    The longitudinal plan of care for the diagnosis(es)/condition(s) as documented were addressed during this visit. Due to the added complexity in care, I will continue to support Casey in the subsequent management and with ongoing continuity of care.    Counseling  Appropriate preventive services were addressed with this patient via screening, questionnaire, or discussion as appropriate for fall prevention, nutrition, physical activity, Tobacco-use cessation, social engagement, weight loss and cognition.  Checklist reviewing preventive services available has been given to the patient.  Reviewed patient's diet, addressing concerns and/or questions.   She is at risk for lack of exercise and has been provided with information to increase physical activity for the benefit of her well-being.   Patient is at risk for social isolation and has been provided  with information about the benefit of social connection.   The patient was instructed to see the dentist every 6 months.   She is at risk for psychosocial distress and has been provided with information to reduce risk.   The patient's PHQ-9 score is consistent with severe depression. She was provided with information regarding depression.     Quincy Peña is a 29 year old, presenting for the following:  Physical and Mental Health Problem        1/16/2025     2:09 PM   Additional Questions   Roomed by Viji BOYD CMA          Patient is a 29-year-old female presenting for annual physical exam.  Medical history significant for anxiety and depression.  Has twin daughters (5 years old) and 3-year-old daughter.    Reports chronic history of depression and anxiety.  Was prescribed sertraline  mg daily approximately 3-4 years ago.  Patient states medication has not been managing mental health well.  Primary care provider prescribed bupropion 75 mg twice daily last year.  With addition of bupropion, patient still struggles with mental health.  She has been off both sertraline and bupropion for approximately a month as she did not have medication refills.  Restarted both medications 2-3 days ago.  Developed diarrhea the first 1-2 days which has now resolved.  Radha in therapy weekly or sometimes twice weekly at outside organization.  Reports overall heaviness related to her depression.  Anxiety has resulted in social isolation.  Some family members do not believe her anxiety and depression are severe as she does not show it on the outside.  She coparents with daughters biological father which can sometimes be difficult and toxic.  Patient states when biological father has children, she tries to catch up with daily responsibilities and self-care but feels exhausted.  Denies any self harm or SI.    Patient has concerns about undiagnosed ADHD and autism.  Reports she has always had difficulty with executive  function.  Patient states she struggled during high school.  She would sleep during class.  Had trouble paying attention.  Failed math, was in math support class.  Sometimes would copy other people's homework.  She would like neurologic psych testing done for both ADHD and autism.    Health Care Directive  Patient does not have a Health Care Directive: Discussed advance care planning with patient; however, patient declined at this time.      1/15/2025   General Health   How would you rate your overall physical health? (!) FAIR   Feel stress (tense, anxious, or unable to sleep) Very much   (!) STRESS CONCERN      1/15/2025   Nutrition   Three or more servings of calcium each day? (!) NO   Diet: Regular (no restrictions)   How many servings of fruit and vegetables per day? (!) 0-1   How many sweetened beverages each day? 0-1         1/15/2025   Exercise   Days per week of moderate/strenous exercise 2 days   Average minutes spent exercising at this level 90 min   (!) EXERCISE CONCERN      1/15/2025   Social Factors   Frequency of gathering with friends or relatives Never   Worry food won't last until get money to buy more Yes   Food not last or not have enough money for food? Yes   Do you have housing? (Housing is defined as stable permanent housing and does not include staying ouside in a car, in a tent, in an abandoned building, in an overnight shelter, or couch-surfing.) Yes   Are you worried about losing your housing? Yes   Lack of transportation? No   Unable to get utilities (heat,electricity)? No   Want help with housing or utility concern? (!) YES   (!) FOOD SECURITY CONCERN PRESENT(!) HOUSING CONCERN PRESENT(!) SOCIAL CONNECTIONS CONCERN      1/15/2025   Dental   Dentist two times every year? (!) NO         1/15/2025   TB Screening   Were you born outside of the [place]? Yes     Today's PHQ-9 Score:       1/15/2025     8:49 PM   PHQ-9 SCORE   PHQ-9 Total Score MyChart 25 (Severe depression)   PHQ-9 Total Score 25   "      Patient-reported         1/15/2025   Substance Use   Alcohol more than 3/day or more than 7/wk No   Do you use any other substances recreationally? (!) CANNABIS PRODUCTS     Social History     Tobacco Use    Smoking status: Never    Smokeless tobacco: Never   Vaping Use    Vaping status: Never Used   Substance Use Topics    Alcohol use: No     Comment: 1-2 drinks per week    Drug use: Not Currently     Types: Marijuana          Mammogram Screening - Mammogram every 1-2 years updated in Health Maintenance based on mutual decision making        1/15/2025   STI Screening   New sexual partner(s) since last STI/HIV test? No     History of abnormal Pap smear: No - age 30-64 HPV with reflex Pap every 5 years recommended        1/15/2025   Contraception/Family Planning   Questions about contraception or family planning No        Reviewed and updated as needed this visit by Provider    Past Medical History:   Diagnosis Date    NO ACTIVE PROBLEMS      Past Surgical History:   Procedure Laterality Date    NO HISTORY OF SURGERY       BP Readings from Last 3 Encounters:   01/16/25 102/70   08/20/19 96/70   08/05/19 102/70    Wt Readings from Last 3 Encounters:   01/16/25 52.6 kg (115 lb 14.4 oz)   08/20/19 55.7 kg (122 lb 11.2 oz)   08/05/19 56.7 kg (125 lb)         Current Outpatient Medications   Medication Sig Dispense Refill    buPROPion (WELLBUTRIN) 75 MG tablet Take 1 tablet (75 mg) by mouth 2 times daily 180 tablet 3    sertraline (ZOLOFT) 50 MG tablet Take 1-2 tablets ( mg) by mouth daily 180 tablet 3     Review of Systems  Review of systems negative otherwise known HPI.     Objective    Exam  /70 (BP Location: Left arm, Patient Position: Sitting, Cuff Size: Adult Regular)   Pulse 110   Temp 97.6  F (36.4  C) (Temporal)   Resp 18   Ht 1.673 m (5' 5.87\")   Wt 52.6 kg (115 lb 14.4 oz)   LMP 12/18/2024 (Approximate)   SpO2 98%   BMI 18.78 kg/m     Estimated body mass index is 18.78 kg/m  as " "calculated from the following:    Height as of this encounter: 1.673 m (5' 5.87\").    Weight as of this encounter: 52.6 kg (115 lb 14.4 oz).    Physical Exam  General: Alert, oriented, no acute distress.     Eyes: Normal external eye, conjunctiva, and lids. ITZEL.  Ears: External ear nontender. Normal landmark and color.   Oropharynx: Dentition intact. Oral and posterior pharynx pink and moist.      Neck: Supple, no masses or nodes. No adenopathy.     Respiratory: Lungs clear, unlabored. No rales, rhonchi, or wheezes.     Cardiovascular: Regular rate and rhythm. No murmurs. No peripheral edema.      Breasts: Bilateral dense breasts. No skin, nipple changes, or axillary nodes.  Gastrointestinal: Normoactive bowel sounds. Soft, nontender, no organomegaly.      Musculoskeletal: No joint tenderness, deformity, or swelling.      Skin: No suspicious lesions, rashes, or abnormalities.     Neurologic: No gross motor or sensory deficit. Mentation intact and speech normal.      Psychiatric: Appropriate affect.     Signed Electronically by: CURTIS Myles CNP    Answers submitted by the patient for this visit:  Patient Health Questionnaire (Submitted on 1/15/2025)  If you checked off any problems, how difficult have these problems made it for you to do your work, take care of things at home, or get along with other people?: Extremely difficult  PHQ9 TOTAL SCORE: 25  Patient Health Questionnaire (G7) (Submitted on 1/15/2025)  CARMELLA 7 TOTAL SCORE: 20    "

## 2025-08-25 ENCOUNTER — PATIENT OUTREACH (OUTPATIENT)
Dept: CARE COORDINATION | Facility: CLINIC | Age: 30
End: 2025-08-25
Payer: COMMERCIAL

## 2025-08-27 ENCOUNTER — PATIENT OUTREACH (OUTPATIENT)
Dept: CARE COORDINATION | Facility: CLINIC | Age: 30
End: 2025-08-27
Payer: COMMERCIAL